# Patient Record
Sex: FEMALE | NOT HISPANIC OR LATINO | URBAN - METROPOLITAN AREA
[De-identification: names, ages, dates, MRNs, and addresses within clinical notes are randomized per-mention and may not be internally consistent; named-entity substitution may affect disease eponyms.]

---

## 2018-08-06 ENCOUNTER — HISTORICAL (OUTPATIENT)
Dept: ADMINISTRATIVE | Facility: HOSPITAL | Age: 38
End: 2018-08-06

## 2018-08-06 LAB
HAV IGM SERPL QL IA: NONREACTIVE
HBV CORE IGM SERPL QL IA: NONREACTIVE
HBV SURFACE AG SERPL QL IA: NEGATIVE
HCV AB SERPL QL IA: NONREACTIVE
HIV 1+2 AB+HIV1 P24 AG SERPL QL IA: NONREACTIVE
T PALLIDUM AB SER QL: NONREACTIVE

## 2019-01-30 ENCOUNTER — HISTORICAL (OUTPATIENT)
Dept: ADMINISTRATIVE | Facility: HOSPITAL | Age: 39
End: 2019-01-30

## 2019-01-30 LAB
ABS NEUT (OLG): 10.06 X10(3)/MCL (ref 2.1–9.2)
BASOPHILS # BLD AUTO: 0.02 X10(3)/MCL
BASOPHILS NFR BLD AUTO: 0 %
BUN SERPL-MCNC: 11 MG/DL (ref 7–18)
CALCIUM SERPL-MCNC: 9.3 MG/DL (ref 8.5–10.1)
CHLORIDE SERPL-SCNC: 103 MMOL/L (ref 98–107)
CO2 SERPL-SCNC: 28 MMOL/L (ref 21–32)
CREAT SERPL-MCNC: 0.9 MG/DL (ref 0.6–1.3)
CREAT/UREA NIT SERPL: 12
CRP SERPL-MCNC: <0.3 MG/DL
EOSINOPHIL # BLD AUTO: 0.09 X10(3)/MCL
EOSINOPHIL NFR BLD AUTO: 1 %
ERYTHROCYTE [DISTWIDTH] IN BLOOD BY AUTOMATED COUNT: 13.6 % (ref 11.5–14.5)
ERYTHROCYTE [SEDIMENTATION RATE] IN BLOOD: 15 MM/HR (ref 0–20)
EST. AVERAGE GLUCOSE BLD GHB EST-MCNC: 169 MG/DL
GLUCOSE SERPL-MCNC: 131 MG/DL (ref 74–106)
HBA1C MFR BLD: 7.5 % (ref 4.2–6.3)
HCT VFR BLD AUTO: 39.4 % (ref 35–46)
HGB BLD-MCNC: 12.9 GM/DL (ref 12–16)
IMM GRANULOCYTES # BLD AUTO: 0.04 10*3/UL
IMM GRANULOCYTES NFR BLD AUTO: 0 %
LYMPHOCYTES # BLD AUTO: 1.73 X10(3)/MCL
LYMPHOCYTES NFR BLD AUTO: 14 % (ref 13–40)
MCH RBC QN AUTO: 27.9 PG (ref 26–34)
MCHC RBC AUTO-ENTMCNC: 32.7 GM/DL (ref 31–37)
MCV RBC AUTO: 85.3 FL (ref 80–100)
MONOCYTES # BLD AUTO: 0.4 X10(3)/MCL
MONOCYTES NFR BLD AUTO: 3 % (ref 4–12)
NEUTROPHILS # BLD AUTO: 10.06 X10(3)/MCL
NEUTROPHILS NFR BLD AUTO: 82 X10(3)/MCL
PLATELET # BLD AUTO: 416 X10(3)/MCL (ref 130–400)
PMV BLD AUTO: 9.9 FL (ref 7.4–10.4)
POTASSIUM SERPL-SCNC: 3.9 MMOL/L (ref 3.5–5.1)
RBC # BLD AUTO: 4.62 X10(6)/MCL (ref 4–5.2)
SODIUM SERPL-SCNC: 138 MMOL/L (ref 136–145)
T4 SERPL-MCNC: 8.4 MCG/DL (ref 4.8–13.9)
TSH SERPL-ACNC: 1.64 MIU/L (ref 0.36–3.74)
WBC # SPEC AUTO: 12.3 X10(3)/MCL (ref 4.5–11)

## 2019-10-14 ENCOUNTER — HISTORICAL (OUTPATIENT)
Dept: ADMINISTRATIVE | Facility: HOSPITAL | Age: 39
End: 2019-10-14

## 2019-10-14 LAB
ABS NEUT (OLG): 5.19 X10(3)/MCL (ref 2.1–9.2)
APPEARANCE, UA: ABNORMAL
BACTERIA #/AREA URNS AUTO: ABNORMAL /[HPF]
BASOPHILS # BLD AUTO: 0 X10(3)/MCL (ref 0–0.2)
BASOPHILS NFR BLD AUTO: 0 %
BILIRUB UR QL STRIP: NEGATIVE
BUN SERPL-MCNC: 8 MG/DL (ref 7–18)
CALCIUM SERPL-MCNC: 8.5 MG/DL (ref 8.5–10.1)
CHLORIDE SERPL-SCNC: 108 MMOL/L (ref 98–107)
CO2 SERPL-SCNC: 29 MMOL/L (ref 21–32)
COLOR UR: ABNORMAL
CREAT SERPL-MCNC: 0.8 MG/DL (ref 0.6–1.3)
CREAT/UREA NIT SERPL: 10
EOSINOPHIL # BLD AUTO: 0.4 X10(3)/MCL (ref 0–0.9)
EOSINOPHIL NFR BLD AUTO: 4 %
ERYTHROCYTE [DISTWIDTH] IN BLOOD BY AUTOMATED COUNT: 14.6 % (ref 11.5–14.5)
EST. AVERAGE GLUCOSE BLD GHB EST-MCNC: 131 MG/DL
GLUCOSE (UA): NORMAL
GLUCOSE SERPL-MCNC: 122 MG/DL (ref 74–106)
HBA1C MFR BLD: 6.2 % (ref 4.2–6.3)
HCT VFR BLD AUTO: 34.1 % (ref 35–46)
HGB BLD-MCNC: 10.6 GM/DL (ref 12–16)
HGB UR QL STRIP: >1 MG/DL
HYALINE CASTS #/AREA URNS LPF: ABNORMAL /[LPF]
IMM GRANULOCYTES # BLD AUTO: 0.02 10*3/UL
IMM GRANULOCYTES NFR BLD AUTO: 0 %
KETONES UR QL STRIP: NEGATIVE
LEUKOCYTE ESTERASE UR QL STRIP: 25 LEU/UL
LYMPHOCYTES # BLD AUTO: 2.3 X10(3)/MCL (ref 0.6–4.6)
LYMPHOCYTES NFR BLD AUTO: 27 %
MCH RBC QN AUTO: 26.1 PG (ref 26–34)
MCHC RBC AUTO-ENTMCNC: 31.1 GM/DL (ref 31–37)
MCV RBC AUTO: 84 FL (ref 80–100)
MONOCYTES # BLD AUTO: 0.5 X10(3)/MCL (ref 0.1–1.3)
MONOCYTES NFR BLD AUTO: 6 %
NEUTROPHILS # BLD AUTO: 5.19 X10(3)/MCL (ref 2.1–9.2)
NEUTROPHILS NFR BLD AUTO: 62 %
NITRITE UR QL STRIP: NEGATIVE
PH UR STRIP: 6 [PH] (ref 4.5–8)
PLATELET # BLD AUTO: 359 X10(3)/MCL (ref 130–400)
PMV BLD AUTO: 10.2 FL (ref 7.4–10.4)
POTASSIUM SERPL-SCNC: 3.8 MMOL/L (ref 3.5–5.1)
PROT UR QL STRIP: 20 MG/DL
RBC # BLD AUTO: 4.06 X10(6)/MCL (ref 4–5.2)
RBC #/AREA URNS AUTO: >=100 /[HPF]
SODIUM SERPL-SCNC: 142 MMOL/L (ref 136–145)
SP GR UR STRIP: 1.02 (ref 1–1.03)
SQUAMOUS #/AREA URNS LPF: ABNORMAL /[LPF]
UROBILINOGEN UR STRIP-ACNC: NORMAL
WBC # SPEC AUTO: 8.4 X10(3)/MCL (ref 4.5–11)
WBC #/AREA URNS AUTO: ABNORMAL /HPF

## 2019-11-13 ENCOUNTER — HISTORICAL (OUTPATIENT)
Dept: RADIOLOGY | Facility: HOSPITAL | Age: 39
End: 2019-11-13

## 2019-12-04 ENCOUNTER — HISTORICAL (OUTPATIENT)
Dept: ADMINISTRATIVE | Facility: HOSPITAL | Age: 39
End: 2019-12-04

## 2019-12-04 LAB
APPEARANCE, UA: CLEAR
BACTERIA #/AREA URNS AUTO: ABNORMAL /HPF
BILIRUB SERPL-MCNC: NEGATIVE MG/DL
BILIRUB UR QL STRIP: NEGATIVE
BLOOD URINE, POC: NORMAL
CLARITY, POC UA: NORMAL
COLOR UR: YELLOW
COLOR, POC UA: YELLOW
GLUCOSE (UA): NEGATIVE
GLUCOSE UR QL STRIP: NEGATIVE
HGB UR QL STRIP: 0.03 MG/DL
HYALINE CASTS #/AREA URNS LPF: ABNORMAL /LPF
KETONES UR QL STRIP: NEGATIVE
KETONES UR QL STRIP: NORMAL
LEUKOCYTE EST, POC UA: NORMAL
LEUKOCYTE ESTERASE UR QL STRIP: 250 LEU/UL
NITRITE UR QL STRIP: NEGATIVE
NITRITE, POC UA: NEGATIVE
PH UR STRIP: 6.5 [PH] (ref 4.5–8)
PH, POC UA: 6
POC BETA-HCG (QUAL): NEGATIVE
PROT UR QL STRIP: 20 MG/DL
PROTEIN, POC: NORMAL
RBC #/AREA URNS AUTO: ABNORMAL /HPF
SP GR UR STRIP: 1.02 (ref 1–1.03)
SPECIFIC GRAVITY, POC UA: 1.02
SQUAMOUS #/AREA URNS LPF: >100 /LPF
UROBILINOGEN UR STRIP-ACNC: NORMAL
UROBILINOGEN, POC UA: NORMAL
WBC #/AREA URNS AUTO: ABNORMAL /HPF

## 2019-12-06 LAB — FINAL CULTURE: NO GROWTH

## 2020-01-02 ENCOUNTER — HISTORICAL (OUTPATIENT)
Dept: ADMINISTRATIVE | Facility: HOSPITAL | Age: 40
End: 2020-01-02

## 2020-01-02 LAB
ABS NEUT (OLG): 5.1 X10(3)/MCL (ref 2.1–9.2)
ALBUMIN SERPL-MCNC: 4 GM/DL (ref 3.4–5)
ALBUMIN/GLOB SERPL: 0.9 RATIO (ref 1.1–2)
ALP SERPL-CCNC: 78 UNIT/L (ref 45–117)
ALT SERPL-CCNC: 101 UNIT/L (ref 12–78)
AST SERPL-CCNC: 49 UNIT/L (ref 15–37)
BASOPHILS # BLD AUTO: 0 X10(3)/MCL (ref 0–0.2)
BASOPHILS NFR BLD AUTO: 0 %
BILIRUB SERPL-MCNC: 0.6 MG/DL (ref 0.2–1)
BILIRUBIN DIRECT+TOT PNL SERPL-MCNC: 0.1 MG/DL (ref 0–0.2)
BILIRUBIN DIRECT+TOT PNL SERPL-MCNC: 0.5 MG/DL
BUN SERPL-MCNC: 10 MG/DL (ref 7–18)
CALCIUM SERPL-MCNC: 9 MG/DL (ref 8.5–10.1)
CHLORIDE SERPL-SCNC: 106 MMOL/L (ref 98–107)
CO2 SERPL-SCNC: 26 MMOL/L (ref 21–32)
CREAT SERPL-MCNC: 1 MG/DL (ref 0.6–1.3)
CRP SERPL-MCNC: <0.3 MG/DL
EOSINOPHIL # BLD AUTO: 0.1 X10(3)/MCL (ref 0–0.9)
EOSINOPHIL NFR BLD AUTO: 2 %
ERYTHROCYTE [DISTWIDTH] IN BLOOD BY AUTOMATED COUNT: 15.6 % (ref 11.5–14.5)
ERYTHROCYTE [SEDIMENTATION RATE] IN BLOOD: 41 MM/HR (ref 0–20)
GLOBULIN SER-MCNC: 4.5 GM/ML (ref 2.3–3.5)
GLUCOSE SERPL-MCNC: 129 MG/DL (ref 74–106)
HCT VFR BLD AUTO: 35 % (ref 35–46)
HGB BLD-MCNC: 11 GM/DL (ref 12–16)
IMM GRANULOCYTES # BLD AUTO: 0.02 10*3/UL
IMM GRANULOCYTES NFR BLD AUTO: 0 %
LYMPHOCYTES # BLD AUTO: 2.4 X10(3)/MCL (ref 0.6–4.6)
LYMPHOCYTES NFR BLD AUTO: 29 %
MCH RBC QN AUTO: 26 PG (ref 26–34)
MCHC RBC AUTO-ENTMCNC: 31.4 GM/DL (ref 31–37)
MCV RBC AUTO: 82.7 FL (ref 80–100)
MONOCYTES # BLD AUTO: 0.5 X10(3)/MCL (ref 0.1–1.3)
MONOCYTES NFR BLD AUTO: 6 %
NEUTROPHILS # BLD AUTO: 5.1 X10(3)/MCL (ref 2.1–9.2)
NEUTROPHILS NFR BLD AUTO: 63 %
PLATELET # BLD AUTO: 415 X10(3)/MCL (ref 130–400)
PMV BLD AUTO: 9.9 FL (ref 7.4–10.4)
POTASSIUM SERPL-SCNC: 3.4 MMOL/L (ref 3.5–5.1)
PROT SERPL-MCNC: 8.5 GM/DL (ref 6.4–8.2)
RBC # BLD AUTO: 4.23 X10(6)/MCL (ref 4–5.2)
SODIUM SERPL-SCNC: 137 MMOL/L (ref 136–145)
WBC # SPEC AUTO: 8.1 X10(3)/MCL (ref 4.5–11)

## 2020-03-11 ENCOUNTER — HISTORICAL (OUTPATIENT)
Dept: RADIOLOGY | Facility: HOSPITAL | Age: 40
End: 2020-03-11

## 2020-06-01 ENCOUNTER — HISTORICAL (OUTPATIENT)
Dept: ADMINISTRATIVE | Facility: HOSPITAL | Age: 40
End: 2020-06-01

## 2020-06-01 LAB
ABS NEUT (OLG): 4.91 X10(3)/MCL (ref 2.1–9.2)
ALBUMIN SERPL-MCNC: 4 GM/DL (ref 3.4–5)
ALBUMIN/GLOB SERPL: 0.9 RATIO (ref 1.1–2)
ALP SERPL-CCNC: 89 UNIT/L (ref 45–117)
ALT SERPL-CCNC: 37 UNIT/L (ref 12–78)
AST SERPL-CCNC: 17 UNIT/L (ref 15–37)
BASOPHILS # BLD AUTO: 0 X10(3)/MCL (ref 0–0.2)
BASOPHILS NFR BLD AUTO: 0 %
BILIRUB SERPL-MCNC: 0.3 MG/DL (ref 0.2–1)
BILIRUBIN DIRECT+TOT PNL SERPL-MCNC: <0.1 MG/DL (ref 0–0.2)
BILIRUBIN DIRECT+TOT PNL SERPL-MCNC: ABNORMAL MG/DL
BUN SERPL-MCNC: 10 MG/DL (ref 7–18)
CALCIUM SERPL-MCNC: 9.4 MG/DL (ref 8.5–10.1)
CHLORIDE SERPL-SCNC: 104 MMOL/L (ref 98–107)
CO2 SERPL-SCNC: 27 MMOL/L (ref 21–32)
CREAT SERPL-MCNC: 0.9 MG/DL (ref 0.6–1.3)
CRP SERPL-MCNC: <0.3 MG/DL
EOSINOPHIL # BLD AUTO: 0.2 X10(3)/MCL (ref 0–0.9)
EOSINOPHIL NFR BLD AUTO: 2 %
ERYTHROCYTE [DISTWIDTH] IN BLOOD BY AUTOMATED COUNT: 15 % (ref 11.5–14.5)
ERYTHROCYTE [SEDIMENTATION RATE] IN BLOOD: 18 MM/HR (ref 0–20)
GLOBULIN SER-MCNC: 4.4 GM/ML (ref 2.3–3.5)
GLUCOSE SERPL-MCNC: 179 MG/DL (ref 74–106)
HBV CORE AB SERPL QL IA: NONREACTIVE
HBV CORE IGM SERPL QL IA: NONREACTIVE
HBV SURFACE AG SERPL QL IA: NONREACTIVE
HCT VFR BLD AUTO: 35.3 % (ref 35–46)
HCV AB SERPL QL IA: NONREACTIVE
HGB BLD-MCNC: 10.9 GM/DL (ref 12–16)
IMM GRANULOCYTES # BLD AUTO: 0.03 10*3/UL
IMM GRANULOCYTES NFR BLD AUTO: 0 %
LYMPHOCYTES # BLD AUTO: 2.4 X10(3)/MCL (ref 0.6–4.6)
LYMPHOCYTES NFR BLD AUTO: 30 %
MCH RBC QN AUTO: 24.5 PG (ref 26–34)
MCHC RBC AUTO-ENTMCNC: 30.9 GM/DL (ref 31–37)
MCV RBC AUTO: 79.5 FL (ref 80–100)
MONOCYTES # BLD AUTO: 0.5 X10(3)/MCL (ref 0.1–1.3)
MONOCYTES NFR BLD AUTO: 6 %
NEUTROPHILS # BLD AUTO: 4.91 X10(3)/MCL (ref 2.1–9.2)
NEUTROPHILS NFR BLD AUTO: 61 %
PLATELET # BLD AUTO: 434 X10(3)/MCL (ref 130–400)
PMV BLD AUTO: 9.9 FL (ref 7.4–10.4)
POTASSIUM SERPL-SCNC: 3.9 MMOL/L (ref 3.5–5.1)
PROT SERPL-MCNC: 8.4 GM/DL (ref 6.4–8.2)
RBC # BLD AUTO: 4.44 X10(6)/MCL (ref 4–5.2)
SODIUM SERPL-SCNC: 136 MMOL/L (ref 136–145)
URATE SERPL-MCNC: 4.9 MG/DL (ref 2.6–6)
WBC # SPEC AUTO: 8.1 X10(3)/MCL (ref 4.5–11)

## 2020-06-15 ENCOUNTER — HISTORICAL (OUTPATIENT)
Dept: ADMINISTRATIVE | Facility: HOSPITAL | Age: 40
End: 2020-06-15

## 2020-06-15 LAB
ABS NEUT (OLG): 6.29 X10(3)/MCL (ref 2.1–9.2)
BASOPHILS # BLD AUTO: 0 X10(3)/MCL (ref 0–0.2)
BASOPHILS NFR BLD AUTO: 0 %
EOSINOPHIL # BLD AUTO: 0.2 X10(3)/MCL (ref 0–0.9)
EOSINOPHIL NFR BLD AUTO: 2 %
ERYTHROCYTE [DISTWIDTH] IN BLOOD BY AUTOMATED COUNT: 15.3 % (ref 11.5–14.5)
HCT VFR BLD AUTO: 35.9 % (ref 35–46)
HGB BLD-MCNC: 10.9 GM/DL (ref 12–16)
IMM GRANULOCYTES # BLD AUTO: 0.02 10*3/UL
IMM GRANULOCYTES NFR BLD AUTO: 0 %
LYMPHOCYTES # BLD AUTO: 2.2 X10(3)/MCL (ref 0.6–4.6)
LYMPHOCYTES NFR BLD AUTO: 24 %
MCH RBC QN AUTO: 24.6 PG (ref 26–34)
MCHC RBC AUTO-ENTMCNC: 30.4 GM/DL (ref 31–37)
MCV RBC AUTO: 81 FL (ref 80–100)
MONOCYTES # BLD AUTO: 0.7 X10(3)/MCL (ref 0.1–1.3)
MONOCYTES NFR BLD AUTO: 7 %
NEUTROPHILS # BLD AUTO: 6.29 X10(3)/MCL (ref 2.1–9.2)
NEUTROPHILS NFR BLD AUTO: 66 %
PLATELET # BLD AUTO: 385 X10(3)/MCL (ref 130–400)
PMV BLD AUTO: 9.9 FL (ref 7.4–10.4)
PROLACTIN LEVEL (OHS): 28 NG/ML (ref 1–24)
RBC # BLD AUTO: 4.43 X10(6)/MCL (ref 4–5.2)
WBC # SPEC AUTO: 9.5 X10(3)/MCL (ref 4.5–11)

## 2020-09-14 ENCOUNTER — HISTORICAL (OUTPATIENT)
Dept: ADMINISTRATIVE | Facility: HOSPITAL | Age: 40
End: 2020-09-14

## 2020-09-14 LAB
ABS NEUT (OLG): 4.58 X10(3)/MCL (ref 2.1–9.2)
ALBUMIN SERPL-MCNC: 3.6 GM/DL (ref 3.4–5)
ALBUMIN/GLOB SERPL: 0.8 RATIO (ref 1.1–2)
ALP SERPL-CCNC: 110 UNIT/L (ref 45–117)
ALT SERPL-CCNC: 40 UNIT/L (ref 12–78)
APPEARANCE, UA: ABNORMAL
AST SERPL-CCNC: 27 UNIT/L (ref 15–37)
BACTERIA #/AREA URNS AUTO: ABNORMAL /HPF
BASOPHILS # BLD AUTO: 0 X10(3)/MCL (ref 0–0.2)
BASOPHILS NFR BLD AUTO: 0 %
BILIRUB SERPL-MCNC: 0.4 MG/DL (ref 0.2–1)
BILIRUB UR QL STRIP: NEGATIVE
BILIRUBIN DIRECT+TOT PNL SERPL-MCNC: <0.1 MG/DL (ref 0–0.2)
BILIRUBIN DIRECT+TOT PNL SERPL-MCNC: ABNORMAL MG/DL
BUN SERPL-MCNC: 10 MG/DL (ref 7–18)
CALCIUM SERPL-MCNC: 8.5 MG/DL (ref 8.5–10.1)
CHLORIDE SERPL-SCNC: 104 MMOL/L (ref 98–107)
CHOLEST SERPL-MCNC: 266 MG/DL
CHOLEST/HDLC SERPL: 6.6 {RATIO} (ref 0–4.4)
CO2 SERPL-SCNC: 27 MMOL/L (ref 21–32)
COLOR UR: YELLOW
CREAT SERPL-MCNC: 0.9 MG/DL (ref 0.6–1.3)
CREAT UR-MCNC: 427 MG/DL
EOSINOPHIL # BLD AUTO: 0.2 X10(3)/MCL (ref 0–0.9)
EOSINOPHIL NFR BLD AUTO: 3 %
ERYTHROCYTE [DISTWIDTH] IN BLOOD BY AUTOMATED COUNT: 14.9 % (ref 11.5–14.5)
EST. AVERAGE GLUCOSE BLD GHB EST-MCNC: 252 MG/DL
GLOBULIN SER-MCNC: 4.4 GM/ML (ref 2.3–3.5)
GLUCOSE (UA): 300 MG/DL
GLUCOSE SERPL-MCNC: 222 MG/DL (ref 74–106)
HBA1C MFR BLD: 10.4 % (ref 4.2–6.3)
HCT VFR BLD AUTO: 34.4 % (ref 35–46)
HDLC SERPL-MCNC: 40 MG/DL (ref 40–59)
HGB BLD-MCNC: 10.6 GM/DL (ref 12–16)
HGB UR QL STRIP: ABNORMAL MG/DL
HYALINE CASTS #/AREA URNS LPF: ABNORMAL /LPF
IMM GRANULOCYTES # BLD AUTO: 0.03 10*3/UL
IMM GRANULOCYTES NFR BLD AUTO: 0 %
KETONES UR QL STRIP: ABNORMAL
LDLC SERPL CALC-MCNC: 186 MG/DL
LEUKOCYTE ESTERASE UR QL STRIP: NEGATIVE
LYMPHOCYTES # BLD AUTO: 2.2 X10(3)/MCL (ref 0.6–4.6)
LYMPHOCYTES NFR BLD AUTO: 30 %
MCH RBC QN AUTO: 24.7 PG (ref 26–34)
MCHC RBC AUTO-ENTMCNC: 30.8 GM/DL (ref 31–37)
MCV RBC AUTO: 80.2 FL (ref 80–100)
MICROALBUMIN UR-MCNC: 188 MG/L (ref 0–19)
MICROALBUMIN/CREAT RATIO PNL UR: 44 MCG/MG CR (ref 0–29)
MONOCYTES # BLD AUTO: 0.4 X10(3)/MCL (ref 0.1–1.3)
MONOCYTES NFR BLD AUTO: 6 %
NEUTROPHILS # BLD AUTO: 4.58 X10(3)/MCL (ref 2.1–9.2)
NEUTROPHILS NFR BLD AUTO: 61 %
NITRITE UR QL STRIP: NEGATIVE
PH UR STRIP: 6 [PH] (ref 4.5–8)
PLATELET # BLD AUTO: 392 X10(3)/MCL (ref 130–400)
PMV BLD AUTO: 9.5 FL (ref 7.4–10.4)
POTASSIUM SERPL-SCNC: 3.6 MMOL/L (ref 3.5–5.1)
PROT SERPL-MCNC: 8 GM/DL (ref 6.4–8.2)
PROT UR QL STRIP: 70 MG/DL
RBC # BLD AUTO: 4.29 X10(6)/MCL (ref 4–5.2)
RBC #/AREA URNS AUTO: >=100 /HPF
SODIUM SERPL-SCNC: 137 MMOL/L (ref 136–145)
SP GR UR STRIP: 1.03 (ref 1–1.03)
SQUAMOUS #/AREA URNS LPF: >100 /LPF
TRIGL SERPL-MCNC: 201 MG/DL
UROBILINOGEN UR STRIP-ACNC: 2 MG/DL
VLDLC SERPL CALC-MCNC: 40 MG/DL
WBC # SPEC AUTO: 7.5 X10(3)/MCL (ref 4.5–11)
WBC #/AREA URNS AUTO: ABNORMAL /HPF

## 2021-01-14 ENCOUNTER — HISTORICAL (OUTPATIENT)
Dept: INTERNAL MEDICINE | Facility: CLINIC | Age: 41
End: 2021-01-14

## 2021-01-14 ENCOUNTER — HISTORICAL (OUTPATIENT)
Dept: ADMINISTRATIVE | Facility: HOSPITAL | Age: 41
End: 2021-01-14

## 2021-01-14 LAB
ABS NEUT (OLG): 3.83 X10(3)/MCL (ref 2.1–9.2)
ALBUMIN SERPL-MCNC: 4 GM/DL (ref 3.5–5)
ALBUMIN/GLOB SERPL: 1 RATIO (ref 1.1–2)
ALP SERPL-CCNC: 76 UNIT/L (ref 40–150)
ALT SERPL-CCNC: 25 UNIT/L (ref 0–55)
AST SERPL-CCNC: 19 UNIT/L (ref 5–34)
BASOPHILS # BLD AUTO: 0 X10(3)/MCL (ref 0–0.2)
BASOPHILS NFR BLD AUTO: 0 %
BILIRUB SERPL-MCNC: 0.4 MG/DL
BILIRUBIN DIRECT+TOT PNL SERPL-MCNC: 0.2 MG/DL (ref 0–0.5)
BILIRUBIN DIRECT+TOT PNL SERPL-MCNC: 0.2 MG/DL (ref 0–0.8)
BUN SERPL-MCNC: 7 MG/DL (ref 7–18.7)
CALCIUM SERPL-MCNC: 9.1 MG/DL (ref 8.4–10.2)
CHLORIDE SERPL-SCNC: 103 MMOL/L (ref 98–107)
CO2 SERPL-SCNC: 27 MMOL/L (ref 22–29)
CREAT SERPL-MCNC: 0.91 MG/DL (ref 0.55–1.02)
EOSINOPHIL # BLD AUTO: 0.2 X10(3)/MCL (ref 0–0.9)
EOSINOPHIL NFR BLD AUTO: 3 %
ERYTHROCYTE [DISTWIDTH] IN BLOOD BY AUTOMATED COUNT: 15.9 % (ref 11.5–14.5)
EST. AVERAGE GLUCOSE BLD GHB EST-MCNC: 205.9 MG/DL
GLOBULIN SER-MCNC: 4.2 GM/DL (ref 2.4–3.5)
GLUCOSE SERPL-MCNC: 135 MG/DL (ref 74–100)
HBA1C MFR BLD: 8.8 %
HCT VFR BLD AUTO: 34.8 % (ref 35–46)
HGB BLD-MCNC: 10.2 GM/DL (ref 12–16)
IMM GRANULOCYTES # BLD AUTO: 0.02 10*3/UL
IMM GRANULOCYTES NFR BLD AUTO: 0 %
LYMPHOCYTES # BLD AUTO: 2.2 X10(3)/MCL (ref 0.6–4.6)
LYMPHOCYTES NFR BLD AUTO: 33 %
MCH RBC QN AUTO: 22.6 PG (ref 26–34)
MCHC RBC AUTO-ENTMCNC: 29.3 GM/DL (ref 31–37)
MCV RBC AUTO: 77 FL (ref 80–100)
MONOCYTES # BLD AUTO: 0.4 X10(3)/MCL (ref 0.1–1.3)
MONOCYTES NFR BLD AUTO: 6 %
NEUTROPHILS # BLD AUTO: 3.83 X10(3)/MCL (ref 2.1–9.2)
NEUTROPHILS NFR BLD AUTO: 58 %
PLATELET # BLD AUTO: 516 X10(3)/MCL (ref 130–400)
PMV BLD AUTO: 9.5 FL (ref 7.4–10.4)
POTASSIUM SERPL-SCNC: 4.2 MMOL/L (ref 3.5–5.1)
PROT SERPL-MCNC: 8.2 GM/DL (ref 6.4–8.3)
RBC # BLD AUTO: 4.52 X10(6)/MCL (ref 4–5.2)
SODIUM SERPL-SCNC: 140 MMOL/L (ref 136–145)
WBC # SPEC AUTO: 6.6 X10(3)/MCL (ref 4.5–11)

## 2021-08-12 LAB
BILIRUB SERPL-MCNC: NEGATIVE MG/DL
BLOOD URINE, POC: NORMAL
CLARITY, POC UA: NORMAL
COLOR, POC UA: YELLOW
GLUCOSE UR QL STRIP: NORMAL
KETONES UR QL STRIP: NEGATIVE
LEUKOCYTE EST, POC UA: NEGATIVE
NITRITE, POC UA: NEGATIVE
PH, POC UA: 6
PROTEIN, POC: NORMAL
SPECIFIC GRAVITY, POC UA: NORMAL
UROBILINOGEN, POC UA: NORMAL

## 2022-04-10 ENCOUNTER — HISTORICAL (OUTPATIENT)
Dept: ADMINISTRATIVE | Facility: HOSPITAL | Age: 42
End: 2022-04-10
Payer: MEDICAID

## 2022-04-26 VITALS
SYSTOLIC BLOOD PRESSURE: 133 MMHG | OXYGEN SATURATION: 100 % | WEIGHT: 269.63 LBS | DIASTOLIC BLOOD PRESSURE: 94 MMHG | HEIGHT: 67 IN | BODY MASS INDEX: 42.32 KG/M2

## 2022-05-03 NOTE — HISTORICAL [facility] CERNER
This is a historical note converted from [employer]. Formatting and pictures may have been removed.  Please reference Cerlinda for original formatting and attached multimedia. Chief Complaint  annual  History of Present Illness  Pt is  here for annual gyn exam. Last pap 2016. Denies hx of abnormal pap. Reports one year hx of urinary frequency. States goes every minute. Pt admits to drinking 2 cups of coffee, tea, and energy drinks throughout the day. Denies hematuria/dysuria. Pt also reports over the last few months her periods are lasting longer (from 2 days to 7) and heavy with blood clots. Reports pelvic pressure. Pt had recent pelvic US with pcp that showed left ovarian cyst. Pt had TL for contraception. Discussed med list. States is currently on pcn for tooth infection.  ?  PMHx: bipolar (seen @ [facility]), HTN, DM, RA, Obesity  ?  Denies hx of dvt, pe, mi, or CVA. Denies fly hx of breast, ovarian, uterine, or colon?cancer.  Review of Systems  Negative except HPI  Physical Exam  Vitals & Measurements  T:?36.7? ?C (Oral)? HR:?76(Peripheral)? RR:?20? BP:?138/93? SpO2:?100%?  HT:?170?cm? WT:?123?kg? BMI:?42.56? LMP:?2019 00:00 CST?  General: Alert and oriented, No acute distress.  Breast: No mass, No tenderness, No discharge.  Gastrointestinal: Soft, Non-tender.  Gynecology:  Labia: Bilateral, Majora, Minora, Within normal limits.  Vagina: scant amount of white discharge  Cervix: No cervical motion tenderness, No lesions.  Uterus: Mobile, Not tender.9 weeks  Ovaries: very difficult exam secondary to body habitus; unable to palpate.  Integumentary: Warm, Dry.  Neurologic: Alert, Oriented.  Psychiatric: Cooperative, Appropriate mood & affect.  Assessment/Plan  1.?Encounter for gynecological examination with abnormal finding?Z01.411  ?pap,hpv  Ordered:  Office/Outpatient Visit Level 3 Established [id number] , Encounter for gynecological examination with abnormal finding  Urinary frequency  Ovarian cyst  Heavy menses   Routine screening for STI (sexually transmitted infection), [facility], 12/04/19 10:54:00 CST  Pathology Gyn Request, 12/04/19 10:54:00 CST, AP Specimen, Thin Prep Pap Cervical-Auto/man screen, Routine GYN/Pap, Cervical, Thin Prep with HPV Probe, Normal, 11/20/19, Previous Pap, 2016, Tubal Ligation, Previous Pregnancy, Abnormal Bleeding, Cervix Present, Routine, Julia...  ?  2.?Urinary frequency?R35.0  ?strongly encouraged decreased caffeine intake; will send UA/urine cx  Ordered:  Office/Outpatient Visit Level 3 Established 16420 , Encounter for gynecological examination with abnormal finding  Urinary frequency  Ovarian cyst  Heavy menses  Routine screening for STI (sexually transmitted infection), [facility], 12/04/19 10:54:00 CST  Urinalysis with Microscopic if Indicated, Routine collect, Urine, Order for future visit, 12/04/19 11:14:00 CST, Stop date 12/04/19 11:14:00 CST, Nurse collect, Urinary frequency  Urine Culture [id number], Routine collect, 12/04/19 11:14:00 CST, Order for future visit, Urine, Clean Catch, Nurse collect, Stop date 12/04/19 11:14:00 CST, Urinary frequency  ?  3.?Ovarian cyst?N83.209  ?Discussed with Dr Dawson who reviewed pts US; Recommendation is to begin provera daily and repeat US in 3 months. Pt instructed to call with any severe abd pain.  Ordered:  Clinic Follow up, 12/11/19 10:57:00 CST, Order for future visit, Ovarian cyst  Office/Outpatient Visit Level 3 Established 53447 , Encounter for gynecological examination with abnormal finding  Urinary frequency  Ovarian cyst  Heavy menses  Routine screening for STI (sexually transmitted infection), [facility], 12/04/19 10:54:00 CST  US Pelvic Non-Obstetrics, Routine, *Est. 03/04/20 3:00:00 CST, Other (please specify), None, Ambulatory, Rad Type, Order for future visit, Ovarian cyst  Heavy menses, Schedule this test, [facility], *Est. 03/04/20 3:00:00 CST  US Transvaginal Non-OB, Routine,  *Est. 03/04/20 3:00:00 CST, Other (please specify), None, Ambulatory, Rad Type, Order for future visit, Ovarian cyst  Heavy menses, Schedule this test, [facility], *Est. 03/04/20 3:00:00 CST  ?  4.?Heavy menses?N92.0  ?will begin provera 10mg daily  Ordered:  Office/Outpatient Visit Level 3 Established [id number] PC, Encounter for gynecological examination with abnormal finding  Urinary frequency  Ovarian cyst  Heavy menses  Routine screening for STI (sexually transmitted infection), [facility], 12/04/19 10:54:00 CST  US Pelvic Non-Obstetrics, Routine, *Est. 03/04/20 3:00:00 CST, Other (please specify), None, Ambulatory, Rad Type, Order for future visit, Ovarian cyst  Heavy menses, Schedule this test, [facility], *Est. 03/04/20 3:00:00 CST  US Transvaginal Non-OB, Routine, *Est. 03/04/20 3:00:00 CST, Other (please specify), None, Ambulatory, Rad Type, Order for future visit, Ovarian cyst  Heavy menses, Schedule this test, [facility], *Est. 03/04/20 3:00:00 CST  ?  5.?Routine screening for STI (sexually transmitted infection)?Z11.3  Ordered:  Chlamydia trach and N. gonorrhea PCR, Routine collect, Cervical, Order for future visit, 12/04/19 10:54:00 CST, Stop date 12/04/19 10:54:00 CST, Nurse collect, Routine screening for STI (sexually transmitted infection)  Hepatitis B Surface Antigen, Routine collect, 12/04/19 10:55:00 CST, Blood, Order for future visit, Stop date 12/04/19 10:55:00 CST, Lab Collect, Routine screening for STI (sexually transmitted infection), 12/04/19 10:55:00 CST  Hepatitis C Antibody, Routine collect, 12/04/19 10:55:00 CST, Blood, Order for future visit, Stop date 12/04/19 10:55:00 CST, Lab Collect, Routine screening for STI (sexually transmitted infection), 12/04/19 10:55:00 CST  HIV 1 and 2, Now collect, 12/04/19 10:54:00 CST, Blood, Order for future visit, Stop date 12/04/19 10:54:00 CST, Lab Collect, Routine screening for  STI (sexually transmitted infection), 12/04/19 10:54:00 CST  Office/Outpatient Visit Level 3 Established 90486 , Encounter for gynecological examination with abnormal finding  Urinary frequency  Ovarian cyst  Heavy menses  Routine screening for STI (sexually transmitted infection), [facility], 12/04/19 10:54:00 CST  Syphilis Antibody (with Reflex RPR), Routine collect, 12/04/19 10:54:00 CST, Blood, Order for future visit, Stop date 12/04/19 10:54:00 CST, Lab Collect, Routine screening for STI (sexually transmitted infection), 12/04/19 10:54:00 CST  Wet Prep Smear, Routine collect, Vaginal, Order for future visit, 12/04/19 10:54:00 CST, Stop date 12/04/19 10:54:00 CST, Nurse collect, Routine screening for STI (sexually transmitted infection), 12/04/19 10:54:00 CST  ?  Orders:  medroxyPROGESTERone, 10 mg = 1 tab(s), Oral, Daily, # 30 tab(s), 5 Refill(s), [facility]acy: [id number]  Urinalysis Dip POC, 12/04/19 10:54:00 CST, [facility]  Urine Pregnancy POC, 12/04/19 10:54:00 CST, [facility]  Referrals  Clinic Follow up, 12/11/19 10:57:00 CST, Order for future visit, Ovarian cyst   Problem List/Past Medical History  Ongoing  Bipolar 1 disorder  Carpal tunnel  De Quervains tenosynovitis  HTN - Hypertension  Morbid obesity  RA (rheumatoid arthritis)  Procedure/Surgical History  Colonoscopy, flexible; diagnostic, including collection of specimen(s) by brushing or washing, when performed (separate procedure) (05/10/2016)  Inspection of Lower Intestinal Tract, Via Natural or Artificial Opening Endoscopic (05/10/2016)  Tubal ligation   Medications  Norvasc 10 mg oral tablet, 10 mg= 1 tab(s), Oral, Daily, 4 refills  penicillin V potassium 250 mg oral tablet, 250 mg= 1 tab(s), Oral, q6hr  Provera 10 mg oral tablet, 10 mg= 1 tab(s), Oral, Daily, 5 refills  Allergies  No Known Medication Allergies  Social History  Abuse/Neglect  No, No, 12/04/2019  No, 11/05/2019  Alcohol - Low Risk,  09/12/2014  Current, 1-2 times per week, 10/14/2019  Employment/School  Employed, 10/14/2019  Exercise  Exercise type: Stretching., 10/14/2019  Home/Environment  Lives with Children. Living situation: Home/Independent., 10/14/2019  Nutrition/Health  Regular, Good, 10/14/2019  Sexual  Sexually active: Yes. Number of current partners 1. Sexual orientation: Straight or heterosexual. Gender Identity Identifies as female. Yes, 12/04/2019  Substance Use - Denies Substance Abuse, 09/12/2014  Never, 03/02/2016  Tobacco - Medium Risk, 03/09/2015  Cigars or pipes but not daily within last 30 days, N/A, 12/04/2019  Cigars or pipes daily within last 30 days, No, 11/05/2019  Cigars or pipes daily within last 30 days, Cigars, No, 10/14/2019  Family History  Hypertension.: Sister.  Immunizations  Vaccine Date Status Comments   influenza virus vaccine, inactivated 10/14/2019 Given    tetanus/diphtheria/pertussis, acel(Tdap) 01/30/2019 Given    influenza virus vaccine, inactivated 01/30/2019 Given    influenza virus vaccine, inactivated 10/13/2015 Given Pt helen well .voices no complaints .novisible distress noted

## 2022-05-03 NOTE — HISTORICAL [facility] CERNER
This is a historical note converted from [employer]. Formatting and pictures may have been removed.  Please reference [employer] for original formatting and attached multimedia. Chief Complaint  AUB states heavy bleeding with periods. Spotting in between periods. Complaints of pelvic pain and bloating  History of Present Illness  40  with HTN, DM2, antiphospholipid syndrome, and bipolar disorder presents for follow up for vaginal bleeding. She reports irregular menses prior to age 32, but subsequently has started to bleed heavily for more days but every month. She reports being on provera now since December that didnt originally help but does help her now.  She also reports urinary urgency, going to the bathroom every 5-15 minutes. She reports drinking numerous bottles of water per day along with 2 energy drinks. Denies any incontinence or burning with urination.  ?  ?  OBhx:  x 4, SAB x 4  Gynecological History  Last Menstrual Period: 22  Menstrual Status Intake: Menarcheal  Sexually Active: No  denies sti history  Review of Systems  Constitutional: No fever, No chills.  Respiratory: No shortness of breath.  Cardiovascular: No chest pain, No syncope.  Gastrointestinal: No nausea, No vomiting, No diarrhea, No constipation.  Genitourinary: No dysuria, No hematuria, No abnormal discharge or bleeding.  Neurologic: Alert and oriented X4  ?  Physical Exam  Vitals & Measurements  T:?36.7? ?C (Oral)? HR:?91(Peripheral)? RR:?20? BP:?128/85?  HT:?170?cm? WT:?128.1?kg? BMI:?44.33? LMP:?2020 00:00 CDT?  General Appearance: Alert, cooperative, no distress,  Abdomen: Soft, non-tender, bowel sounds active all four quadrants, no masses, no organomegaly  Genitalia:  - External genitalia: Normal without lesions  - Urethral meatus: Normal  - Bladder: No suprapubic tenderness  - Vaginal/pelvic support: Normal, moist vaginal mucosa without lesions. No blood  - Cervix: No CMT, no lesions  - Uterus: difficult to assess due  to habitus  - Adnexa/parametria: No fullness or masses  Extremities: Extremities normal, atraumatic, no cyanosis or edema  Skin: Skin turgor normal, no rashes or lesions.  ?   Endometrial Biopsy:  ?  UPT performed today was negative. ?Informed consent was obtained. Consent forms were signed and witnessed. ?  The patient was placed in dorsal lithotomy position. ?A speculum was placed in the vagina and good visualization of the cervix was achieved.  The cervix was swabbed with betadine x 3. ?The anterior lip of the cervix was grasped with a single-toothed tenaculum.  The endometrial pipelle was advanced to the fundus without difficulty. ?Two passes were performed and adequate tissue was noted.?  The tenaculum was removed. ?Tenaculum sites were noted to be hemostatic. All instruments were removed from the vagina. ?The patient tolerated the procedure well. ?  Endometrial biopsy was labeled and sent to pathology. ??  Assessment/Plan  1.?Menorrhagia?N92.0  ?US pelvis reviewed suspicious for adenomyosis discussed possible mirena IUD for bleeding but patient unsure. would like to continue provera for now. Not an estrogen candidate due to APL. rtc in 3 weeks for possible mirena insertion. emb today given multiple risk factors  Ordered:  CBC w/ Auto Diff, Routine collect, 06/15/20 11:53:00 CDT, Blood, Order for future visit, Stop date 06/15/20 11:53:00 CDT, Lab Collect, Menorrhagia, 06/15/20 11:53:00 CDT  Clinic Follow up, *Est. 07/13/20 3:00:00 CDT, Order for future visit, Menorrhagia  Pathology Tissue [facility], 06/15/20 12:02:00 CDT, AP Specimen, menorrhagia, endometrial biopsy, Routine, Collected, Hold Until Collected, RT - Routine, Menorrhagia, 06/15/20 12:02:00 CDT  Prolactin, Routine collect, 06/15/20 11:52:00 CDT, Blood, Order for future visit, Stop date 06/15/20 11:52:00 CDT, Lab Collect, Menorrhagia, 06/15/20 11:52:00 CDT  ?  2.?Urinary urgency?R39.15  ?discussed lifestyle changes including decreasing the amount of PO  intake and energy drinks. no indication for medication at this point  ?  3.?Obesity?E66.9  discussed healthy?eating and exercise. Patient to attempt to improve her diet and start to exercise more  ?   OB History  Pregnancy History???(2,2,0,4)?? ??  Pregnancy # 1  Baby 1?????????????  Outcome Date:?   ???   Outcome:?Live Birth  ???  Outcome or Result:?Vaginal  ???  Gender:?  Male????????  Gest Age:?  36 weeks ??????  Wt:?--  ???  Hospital:?  --????????  [facility]:?--  ???  Kalpana Name:?  --?????  Babys Father:?--  ?  Pregnancy # 2  Baby 1?????????????  Outcome Date:?   ???   Outcome:?Live Birth  ???  Outcome or Result:?Vaginal  ???  Gender:?  Female????????  Gest Age:?  36 weeks ??????  Wt:?--  ???  Hospital:?  --????????  [facility]:?--  ???  Kalpana Name:?  --?????  Babys Father:?--  ?  Pregnancy # 3  Baby 1?????????????  Outcome Date:?   ???   Outcome:?Live Birth  ???  Outcome or Result:?Vaginal  ???  Gender:?  Male????????  Gest Age:?  40 weeks ??????  Wt:?--  ???  Hospital:?  --????????  [facility]:?--  ???  Kalpana Name:?  --?????  Babys Father:?--  ?  Pregnancy # 4  Baby 1?????????????  Outcome Date:?   ???   Outcome:?Live Birth  ???  Outcome or Result:?Vaginal  ???  Gender:?  Female????????  Gest Age:?  40 weeks ??????  Wt:?--  ???  Hospital:?  --????????  [facility]:?--  ???  Kalpana Name:?  --?????  Babys Father:?--  Problem List/Past Medical History  Ongoing  Bipolar 1 disorder  Carpal tunnel syndrome  De Quervains tenosynovitis  Elevated sed rate  Fibromyalgia  HTN - Hypertension  Miscarriage  Morbid obesity  Positive cardiolipin antibodies  Soft tissue rheumatism  Historical  Carpal tunnel  Pregnant  Pregnant  Pregnant  Pregnant  RA (rheumatoid arthritis)  Rheumatoid factor positive  Procedure/Surgical History  Colonoscopy, flexible; diagnostic, including collection of specimen(s) by brushing or washing, when performed (separate procedure)  (05/10/2016)  Inspection of Lower Intestinal Tract, Via Natural or Artificial Opening Endoscopic (05/10/2016)  Tubal ligation   Medications  metformin 500 mg oral tablet, 500 mg= 1 tab(s), Oral, Daily, 6 refills  Norvasc 10 mg oral tablet, 10 mg= 1 tab(s), Oral, Daily, 4 refills  Provera 10 mg oral tablet, 10 mg= 1 tab(s), Oral, Daily, 5 refills  Allergies  No Known Medication Allergies  Social History  Abuse/Neglect  No, No, Yes, 12/09/2019  Alcohol - Low Risk, 09/12/2014  Current, 1-2 times per week, 10/14/2019  Employment/School  Employed, 10/14/2019  Exercise  Exercise type: Stretching., 10/14/2019  Home/Environment  Lives with Children. Living situation: Home/Independent., 10/14/2019  Nutrition/Health  Regular, Good, 10/14/2019  Sexual  Sexually active: Yes. Number of current partners 1. Sexual orientation: Straight or heterosexual. Gender Identity Identifies as female. Yes, 12/04/2019  Spiritual/Cultural  Mu-ism, Yes, 12/09/2019  Substance Use - Denies Substance Abuse, 09/12/2014  Never, 03/02/2016  Tobacco - Medium Risk, 03/09/2015  Cigars or pipes but not daily within last 30 days, Cigars, No, 06/15/2020  Marital Status    Diagnostic Results  (03/11/2020 12:59 CDT US Pelvic Non-OB w Transvag if needed)  Reason For Exam  Unspecified ovarian cyst;Other (please specify)  ?  Radiology Report  History:  Unspecified ovarian cyst  ?  Comparison:  13 November 2019  ?  Findings:  Transabdominal and transvaginal scanning of the pelvis with grayscale  and color Doppler.  ?  The anteverted uterus measures 8 cm in length. The endometrium  measures about 6 mm which is normal. There are nabothian cysts.  ?  The ovaries are normal in appearance for patients age with small  follicles. Transvaginally the right ovary measures 3.4 x 2.6 x 2.6 cm.  The left ovary measures 3.1 x 2.4 x 2.2 cm.  ?  No adnexal mass or free fluid.  ?  Impression:  Pelvic ultrasound is within normal limits for age. No suspicious  ovarian  lesion today.  ?  ?  Signature Line  Electronically Signed By: Skyler Ferrer MD  Date/Time Signed: 03/11/2020 13:33  ?  ?  This document has an image [1]     [1]?US Pelvic Non-OB w Transvag if needed; Skyler Ferrer MD 03/11/2020 12:59 CDT   Reviewed the patients medical history, residents findings on physical exam, and the diagnosis with treatment plan. Care provided was reasonable and necessary.?  I was personally present on this date of service and discussed the patients care on this actual date (amendment entered later).

## 2022-05-03 NOTE — HISTORICAL [facility] CERNER
This is a historical note converted from Jacques. Formatting and pictures may have been removed.  Please reference Jacques for original formatting and attached multimedia. Chief Complaint  f/u visit  History of Present Illness  PMHx: RA, HTN, bipolar disorder, carpal tunnel syndrome; chronic sinus congestion  LV: 09/2020  This is a 40 y.o AAF who presents to medicine clinic as a f/u.  ?   Has hx of bipolar/ depression -?reports anxiety symptoms well-controlled on?paroxetine; No SI/HI in clinic right now.  Followed by GYN for metromenorrhagia. Reports non-compliance with Provera. Also being followed by rheumatology for hx of RA. Reports referral was sent by Rheum for PT but patient states she missed her Physical Therapy appt. Currently not on any DMARDs; on Flexeril.  Reports pain all over, not watching her diet. She is exercising, doing PIYO. She reports nerve pain is everywhere and hard to tell what is new vs old pain.  Currently taking OTC NSAIDs for hx of RA- had blood work done recently.? Denies sob, cp, abdominal pain, blood in urine/stool.?Last??seen Rheum on 06/2020  ?   Patient was also diagnosed with Carpal tunnel syndrome in?[place] and given a referral to ortho for surgery but states she has not had an appt with them yet.? another referral today was sent during last visit. Patient reports noncompliance with her lisinopril despite having 3 refills left.  ?   Patient also reports areas of irritation and rash on occasion when she sweats frequently underneath her breasts. She denies the same rash in her groin. She reports daily cleaning and states she has tried powder to keep the area dry with minimal improvement.  Review of Systems  12 point ROS was performed and?all negative except as stated in the HPI.  Physical Exam  Vitals & Measurements  T:?37.5? ?C (Oral)? HR:?92(Peripheral)? RR:?20? BP:?119/74?  HT:?169.00?cm? WT:?122.800?kg? BMI:?43? LMP:?12/21/2020 00:00 CST?  General: well-nourished, well-developed in  no acute distress  HEENT: Atraumatic, normocephalic.  Neck: No JVD or carotid bruits. No lymphadenopathy.  Heart: RRR, no murmurs, gallops, clicks or rubs. S1, S2 present.?  Lungs: CTAB without rales, wheezes or rhonchi. Normal work of breathing. Chest rise symmetrical on inspiration.  Abd: Obese, Soft, non-tender, non-distended and without guarding. Bowel sounds present.  Extremities: Radial and pedal pulses 2+ bilaterally, no LE edema.  MSK: Moves all extremities purposefully. Mild swelling in bilateral fingers  Skin: Warm, dry. Areas of erythema underneath breasts folds with irritation.  Assessment/Plan  Essential Hypertension  Continue Norvasc 10mg and encouraged compliance with lisinopril 5mg daily for renal protection  Patient to keep BP log  DASH diet and exercise encouraged  ?   Rheumatoid arthritis  Not on any DMARDs at this time  also w/ hx of carpal tunnel syndrome - sent referral to ortho clinic at last visit, pending appt  Rx with acetaminophen for now.  Follows with Rheumatology clinic - missed her physical therapy session  ?   Diabetes Mellitus uncontrolled  A1C 10.4% on 09/2020  Counselled on dietary and lifestyle changes  Continue?metformin 1000 BID  Repeat A1C today  Eye exam - seen in fundus clinic on 09/2020 with no retinopathy, to follow up q1y  Will start on Invokana 100mg daily?for cardio protection benefits given A1c 10.4%, instructed patient to keep CBG log  Script given for lancets, test strips, and glucometer  Will see patient back in one month with glucose log to re-eval Invokana response  ?   Abnormal uterine bleeding  Follows with GYN clinic, last seen on 07/2020  Pt has one year supply of refills with Provera, reports she has not been taking due to running out of refills -discussed with patient she has multiple refills per chart review and should call GYN clinic if she is having difficulty filling this med  Counselled patient on compliance  ?   Hx of Bipolar Disorder /  Depression  Currently taking Paroxetine 20mg pod  Followed by outside psychiatrist  will defer mgmt to them  No SI/ HI today  ?   Intertrigo  Rx with ketoconazole cream 2% 1 christiano TOP daily x 2weeks  Educated on hygiene and weight loss  ?  ?  Health Maintenance:  TDAP: boostrix 2019  CAGE screenin today  HIV: NR 2018  Hepatitis: Neg 2018  Pneumococcal vaccine: N/A  Influenza vaccine: today 2021  Zoster vaccine: N/A  AAA U/S screening: N/A  Screening colonoscopy: over age 50  Pap smear: GYN following pt.  ?  ?  Counselling:  - Patient instructed to keep BP diary and CBG log  - Patient to comply with dietary modifications  - Relevant educational materials provided  ?  Ordered A1c  Imaging: none  Medications: reconciled, discussed and refills given.  RTC in?1 month.  Referrals  Clinic Follow up, *Est. 21 7:45:00 CST, Order for future visit, Diabetes, [facility]   Problem List/Past Medical History  Ongoing  Bipolar 1 disorder  Carpal tunnel syndrome  De Quervains tenosynovitis  Elevated sed rate  Fibromyalgia  HTN - Hypertension  Miscarriage  Morbid obesity  Positive cardiolipin antibodies  Rheumatoid arthritis  Soft tissue rheumatism  Historical  Carpal tunnel  Pregnant  Pregnant  Pregnant  Pregnant  RA (rheumatoid arthritis)  Rheumatoid factor positive  Procedure/Surgical History  Colonoscopy, flexible; diagnostic, including collection of specimen(s) by brushing or washing, when performed (separate procedure) (05/10/2016)  Inspection of Lower Intestinal Tract, Via Natural or Artificial Opening Endoscopic (05/10/2016)  Tubal ligation   Medications  cyclobenzaprine 10 mg oral tablet, 10 mg= 1 tab(s), Oral, TID,? ?Still taking, not as prescribed: bottle present  gabapentin 400 mg oral capsule, 400 mg= 1 cap(s), Oral, TID, 3 refills,? ?Still taking, not as prescribed: bottle present  Glucometer, See Instructions  Invokana 100 mg oral tablet, 100 mg= 1 tab(s), Oral, Daily, 1 refills  ketoconazole  2% topical cream, 1 christiano, TOP, Daily  Lancets and test strips, See Instructions, 11 refills  lisinopril 5 mg oral tablet, 5 mg= 1 tab(s), Oral, Daily, 3 refills,? ?Still taking, not as prescribed: bottle present  metFORMIN 1000 mg oral tablet, 1000 mg= 1 tab(s), Oral, BID, 3 refills,? ?Still taking, not as prescribed: bottle present  Norvasc 10 mg oral tablet, 10 mg= 1 tab(s), Oral, Daily, 4 refills,? ?Still taking, not as prescribed: bottle present  omeprazole 20 mg oral DR capsule, 20 mg= 1 cap(s), Oral, Daily, 6 refills  Paxil 20 mg oral tablet, 20 mg= 1 tab(s), Oral, Daily, 6 refills  Provera 10 mg oral tablet, 10 mg= 1 tab(s), Oral, Daily, 12 refills,? ?Still taking, not as prescribed  Zyrtec 10 mg oral tablet, 10 mg= 1 tab(s), Oral, Daily, 3 refills,? ?Still taking, not as prescribed: bottle present  Allergies  No Known Allergies  No Known Medication Allergies  Social History  Abuse/Neglect  No, No, Yes, 07/08/2020  No, No, Yes, 06/25/2020  Alcohol - Low Risk, 09/12/2014  Current, 1-2 times per week, 10/14/2019  Employment/School  Employed, 10/14/2019  Exercise  Exercise type: Stretching., 10/14/2019  Home/Environment  Lives with Children. Living situation: Home/Independent., 10/14/2019  Nutrition/Health  Regular, Good, 10/14/2019  Sexual  Sexually active: Yes. Number of current partners 1. Sexual orientation: Straight or heterosexual. Gender Identity Identifies as female. Yes, 12/04/2019  Spiritual/Cultural  Yarsanism, Yes, 12/09/2019  Substance Use - Denies Substance Abuse, 09/12/2014  Never, 03/02/2016  Tobacco - Medium Risk, 03/09/2015  Cigars or pipes but not daily within last 30 days, No, 07/08/2020  Cigars or pipes but not daily within last 30 days, No, 06/25/2020  Family History  Hypertension.: Sister.  Immunizations  Vaccine Date Status Comments   influenza virus vaccine, inactivated 01/04/2021 Given    influenza virus vaccine, inactivated 10/14/2019 Given    tetanus/diphtheria/pertussis, acel(Tdap)  01/30/2019 Given    influenza virus vaccine, inactivated 01/30/2019 Given    influenza virus vaccine, inactivated 10/13/2015 Given Pt helen well .voices no complaints .novisible distress noted   tetanus/diphtheria/pertussis, acel(Tdap) 08/19/2009 Recorded    measles/mumps/rubella virus vaccine 12/11/2002 Recorded    measles/mumps/rubella virus vaccine 08/08/1983 Recorded    Health Maintenance  Health Maintenance  ???Pending?(in the next year)  ???There are no current recommendations pending  ??? ??Due In Future?  ??? ? ? ?Diabetes Maintenance-Foot Exam not due until??06/25/21??and every 1??year(s)  ??? ? ? ?Diabetes Maintenance-HgbA1c not due until??09/14/21??and every 1??year(s)  ??? ? ? ?Hypertension Management-BMP not due until??09/14/21??and every 1??year(s)  ??? ? ? ?Diabetes Maintenance-Fasting Lipid Profile not due until??09/14/21??and every 1??year(s)  ??? ? ? ?Diabetes Maintenance-Serum Creatinine not due until??09/14/21??and every 1??year(s)  ??? ? ? ?Hypertension Management-Education not due until??09/14/21??and every 1??year(s)  ??? ? ? ?Influenza Vaccine not due until??10/01/21??and every 1??day(s)  ??? ? ? ?Obesity Screening not due until??01/01/22??and every 1??year(s)  ??? ? ? ?Smoking Cessation not due until??01/01/22??and every 1??year(s)  ??? ? ? ?Alcohol Misuse Screening not due until??01/02/22??and every 1??year(s)  ???Satisfied?(in the past 1 year)  ??? ??Satisfied?  ??? ? ? ?ADL Screening on??01/04/21.??Satisfied by Becki Irving LPN  ??? ? ? ?Alcohol Misuse Screening on??01/04/21.??Satisfied by Susanna Winslow DO  ??? ? ? ?Blood Pressure Screening on??01/04/21.??Satisfied by Becki Irving LPN  ??? ? ? ?Body Mass Index Check on??01/04/21.??Satisfied by Becki Irving LPN  ??? ? ? ?Depression Screening on??01/04/21.??Satisfied by Becki Irving LPN  ??? ? ? ?Diabetes Maintenance-Eye Exam on??09/19/20.??Satisfied by Cricket Rosales MD  ??? ? ? ?Diabetes Maintenance-Fasting  Lipid Profile on??09/14/20.??Satisfied by Alvina Babb  ??? ? ? ?Diabetes Maintenance-HgbA1c on??09/14/20.??Satisfied by Alvina Babb  ??? ? ? ?Diabetes Maintenance-Serum Creatinine on??09/14/20.??Satisfied by Alvina Babb  ??? ? ? ?Diabetes Maintenance-Microalbumin on??09/14/20.??Satisfied by Alvina Babb  ??? ? ? ?Diabetes Maintenance-Foot Exam on??06/25/20.??Satisfied by Kylah Darling MD  ??? ? ? ?Diabetes Screening on??09/14/20.??Satisfied by Alvina Babb  ??? ? ? ?Hypertension Management-Blood Pressure on??01/04/21.??Satisfied by Becki Irving LPN  ??? ? ? ?Hypertension Management-Education on??09/14/20.??Satisfied by Susanna Winslow DO  ??? ? ? ?Hypertension Management-BMP on??09/14/20.??Satisfied by Alvina Babb  ??? ? ? ?Influenza Vaccine on??01/04/21.??Satisfied by Becki Irving LPN  ??? ? ? ?Obesity Screening on??01/04/21.??Satisfied by Becki Irving LPN  ??? ? ? ?Smoking Cessation on??01/04/21.??Satisfied by Susanna Winslow DO  ?      Patient discussed in clinic with medicine resident  Agree with STARTING iNVOKANA  Will be seen repeat labs  Agree with above assessment and plan  ?

## 2022-05-03 NOTE — HISTORICAL [facility] CERN[facility]
This is a historical note converted from Jacques. Formatting and pictures may have been removed.  Please reference Jacques for original formatting and attached multimedia. Chief Complaint  Pt here for a follow up and review labs  History of Present Illness  PMHx: RA, HTN, bipolar disorder, carpal tunnel syndrome; chronic sinus congestion  LV: june 2020  This is a 40 y.o AAF who presents to medicine clinic as a f/u.  ?   Has hx of bipolar/ depression -?reports anxiety symptoms well-controlled on?paroxetine; No SI/HI in clinic right now.  Followed by GYN for metromenorrhagia. Also being followed by rheumatology for hx of RA. Reports referral was sent by Rheum for PT but patient has not received any appt. Spoke with nurse and patient needs new referral for PT. Will send today. Currently not on any DMARDs; on Flexeril.  Reports pain all over, not watching her diet. She is exercising, doing PIYO. She reports nerve pain is everywhere and hard to tell what is new vs old pain. Reports pins and needles sensation in her hands and feet. She states the only time the gabapentin works is when she takes it with the Tylenol and muscle relaxer. She thinks the gabapentin is not working as well anymore.  ?   Currently taking OTC NSAIDs for hx of RA- had blood work done recently.? Denies sob, cp, abdominal pain, blood in urine/stool.?  ?  Patient was also diagnosed with Carpal tunnel syndrome in?[place] and given a referral to ortho for surgery but states she has not had an appt with them yet. Will send another referral today.  Review of Systems  12 point ROS was performed and?all negative except as stated in the HPI.  Physical Exam  Vitals & Measurements  T:?36.8? ?C (Oral)? HR:?101(Peripheral)? RR:?20? BP:?116/89?  HT:?169.00?cm? WT:?122.200?kg? BMI:?42.79? LMP:?09/14/2020 00:00 CDT?  General: well-nourished, well-developed in no acute distress  HEENT: Atraumatic, normocephalic.  Neck: No JVD or carotid bruits. No lymphadenopathy.  Heart:  RRR, no murmurs, gallops, clicks or rubs. S1, S2 present.?  Lungs: CTAB without rales, wheezes or rhonchi. Normal work of breathing. Chest rise symmetrical on inspiration.  Abd: Obese, Soft, non-tender, non-distended and without guarding. Bowel sounds present.  Extremities: Radial and pedal pulses 2+ bilaterally, no LE edema.  MSK: Moves all extremities purposefully. Mild swelling in bilateral fingers  Skin: Warm, dry and without rashes.  Assessment/Plan  Essential Hypertension  Continue Norvasc 10mg  Patient to keep BP log  DASH diet and exercise encouraged  Will start Lisinopril 5 mg daily for renal protection  ?   Rheumatoid arthritis  Not on any DMARDs at this time  also w/ hx of carpal tunnel syndrome - sent referral to ortho clinic today  Rx with acetaminophen for now.  Follows with Rheumatology clinic  ?   Diabetes Mellitus uncontrolled  A1C 10.4%  Counselled on dietary and lifestyle changes  Increased metformin to 1000 BID  Repeat A1C in 2 months for reeval  Eye exam done today - requested results  ?  ?   Hx of Bipolar Disorder / Depression  Currently taking Paroxetine 20mg pod  Followed by outside psychiatrist  will defer mgmt to them  No SI/ HI today  ?  ?   Health Maintenance:  TDAP: boostrix 2019  CAGE screenin/4 Oct 2019  HIV: NR 2018  Hepatitis: Neg 2018  Pneumococcal vaccine: N/A  Influenza vaccine: today 2019  Zoster vaccine: N/A  AAA U/S screening: N/A  Screening colonoscopy: over age 50  Pap smear: GYN following pt.  ?  ?  Counselling:  - Patient instructed to keep BP diary  - Patient to comply with dietary modifications  - Relevant educational materials provided  ?  Ordered A1c  Imaging: none  Medications: reconciled, discussed and refills given.  RTC in?2 months  ?   Problem List/Past Medical History  Ongoing  Bipolar 1 disorder  Carpal tunnel syndrome  De Quervains tenosynovitis  Elevated sed rate  Fibromyalgia  HTN - Hypertension  Miscarriage  Morbid obesity  Positive  cardiolipin antibodies  Soft tissue rheumatism  Historical  Carpal tunnel  Pregnant  Pregnant  Pregnant  Pregnant  RA (rheumatoid arthritis)  Rheumatoid factor positive  Procedure/Surgical History  Colonoscopy, flexible; diagnostic, including collection of specimen(s) by brushing or washing, when performed (separate procedure) (05/10/2016)  Inspection of Lower Intestinal Tract, Via Natural or Artificial Opening Endoscopic (05/10/2016)  Tubal ligation   Medications  aspirin 81 mg oral tablet, 81 mg= 1 tab(s), Oral, Daily  cyclobenzaprine 10 mg oral tablet, 10 mg= 1 tab(s), Oral, TID  gabapentin 400 mg oral capsule, 400 mg= 1 cap(s), Oral, TID, 3 refills  lisinopril 5 mg oral tablet, 5 mg= 1 tab(s), Oral, Daily, 3 refills  metFORMIN 1000 mg oral tablet, 1000 mg= 1 tab(s), Oral, BID, 3 refills  Norvasc 10 mg oral tablet, 10 mg= 1 tab(s), Oral, Daily, 4 refills  omeprazole 20 mg oral DR capsule, 20 mg= 1 cap(s), Oral, Daily, 6 refills  Paxil 20 mg oral tablet, 20 mg= 1 tab(s), Oral, Daily, 6 refills  Provera 10 mg oral tablet, 10 mg= 1 tab(s), Oral, Daily, 12 refills  Zyrtec 10 mg oral tablet, 10 mg= 1 tab(s), Oral, Daily, 3 refills  Allergies  No Known Allergies  No Known Medication Allergies  Social History  Abuse/Neglect  No, No, Yes, 07/08/2020  No, No, Yes, 06/25/2020  Alcohol - Low Risk, 09/12/2014  Current, 1-2 times per week, 10/14/2019  Employment/School  Employed, 10/14/2019  Exercise  Exercise type: Stretching., 10/14/2019  Home/Environment  Lives with Children. Living situation: Home/Independent., 10/14/2019  Nutrition/Health  Regular, Good, 10/14/2019  Sexual  Sexually active: Yes. Number of current partners 1. Sexual orientation: Straight or heterosexual. Gender Identity Identifies as female. Yes, 12/04/2019  Spiritual/Cultural  Holiness, Yes, 12/09/2019  Substance Use - Denies Substance Abuse, 09/12/2014  Never, 03/02/2016  Tobacco - Medium Risk, 03/09/2015  Cigars or pipes but not daily within last 30  days, No, 07/08/2020  Cigars or pipes but not daily within last 30 days, No, 06/25/2020  Family History  Hypertension.: Sister.  Immunizations  Vaccine Date Status Comments   influenza virus vaccine, inactivated 10/14/2019 Given    tetanus/diphtheria/pertussis, acel(Tdap) 01/30/2019 Given    influenza virus vaccine, inactivated 01/30/2019 Given    influenza virus vaccine, inactivated 10/13/2015 Given Pt helen well .voices no complaints .novisible distress noted   Health Maintenance  Health Maintenance  ???Pending?(in the next year)  ??? ??OverDue  ??? ? ? ?Diabetes Maintenance-Microalbumin due??and every?  ??? ? ? ?Influenza Vaccine due??and every?  ??? ??Due?  ??? ? ? ?Diabetes Maintenance-Eye Exam due??09/14/20??and every?  ??? ??Due In Future?  ??? ? ? ?Obesity Screening not due until??01/01/21??and every 1??year(s)  ??? ? ? ?Smoking Cessation not due until??01/01/21??and every 1??year(s)  ??? ? ? ?Alcohol Misuse Screening not due until??01/02/21??and every 1??year(s)  ??? ? ? ?ADL Screening not due until??06/01/21??and every 1??year(s)  ??? ? ? ?Diabetes Maintenance-Foot Exam not due until??06/25/21??and every 1??year(s)  ???Satisfied?(in the past 1 year)  ??? ??Satisfied?  ??? ? ? ?ADL Screening on??06/01/20.??Satisfied by Berna Goss LPN  ??? ? ? ?Alcohol Misuse Screening on??06/25/20.??Satisfied by Kylah Darling MD  ??? ? ? ?Blood Pressure Screening on??09/14/20.??Satisfied by Cristel Mattson LPN  ??? ? ? ?Body Mass Index Check on??09/14/20.??Satisfied by Cristel Mattson LPN  ??? ? ? ?Cervical Cancer Screening on??12/04/19.??Satisfied by Willow Shaw  ??? ? ? ?Depression Screening on??09/14/20.??Satisfied by Cristel Mattson LPN  ??? ? ? ?Diabetes Maintenance-Eye Exam on??09/14/20.??Satisfied by Susanna Winslow DO  ??? ? ? ?Diabetes Maintenance-Fasting Lipid Profile on??09/14/20.??Satisfied by Alvina Babb  ??? ? ? ?Diabetes Maintenance-HgbA1c on??09/14/20.??Satisfied by Alvina Babb  Jc  ??? ? ? ?Diabetes Maintenance-Microalbumin on??09/14/20.??Satisfied by Avlina Babb  ??? ? ? ?Diabetes Maintenance-Foot Exam on??06/25/20.??Satisfied by Kylah Darling MD  ??? ? ? ?Diabetes Screening on??09/14/20.??Satisfied by Alvina Babb  ??? ? ? ?Hypertension Management-Education on??09/14/20.??Satisfied by Susanna Winslow DO  ??? ? ? ?Hypertension Management-Blood Pressure on??09/14/20.??Satisfied by Cristel Mattson LPN  ??? ? ? ?Hypertension Management-BMP on??09/14/20.??Satisfied by Alvina Babb  ??? ? ? ?Influenza Vaccine on??10/14/19.??Satisfied by Erwin Huerta RN  ??? ? ? ?Obesity Screening on??09/14/20.??Satisfied by Cristel Mattson LPN  ??? ? ? ?Smoking Cessation on??09/14/20.??Satisfied by Susanna Winslow DO  ?      Patient discussed in clinic with medicine resident  Care provided is appropriate  Agree with above assessment and plan

## 2022-05-03 NOTE — HISTORICAL [facility] CERNER
This is a historical note converted from Jacques. Formatting and pictures may have been removed.  Please reference Jacques for original formatting and attached multimedia. Chief Complaint  annual  History of Present Illness  Pt is  here for annual gyn exam. Denies hx of abnormal pap. Last pap?3/2016=NIL; HPV negative[1] .?LMP 18.?Denies abnormal bleeding/discharge. Denies pain. Pt had TL for contraception. No gyn complaints. Pt has new partner and desires sti screening. Pt  is supposed to be on paxil 20mg po daily for depression/anxiety but has been out of meds. She is typically followed at [facility]. Denies suicidal/homicidal ideations.  has a good support system and prays when she gets stressed. 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  Review of Systems  Negative except HPI  Physical Exam  Vitals & Measurements  T:?98.1? ?F (Oral)? HR:?81(Peripheral)? RR:?20? BP:?147/98?  HT:?170?cm? HT:?170?cm? WT:?122.3?kg? WT:?122.3?kg? BMI:?42.32?  General: Alert and oriented, No acute distress.  Breast: No mass, No tenderness, No discharge.  Gastrointestinal: Soft, Non-tender.  Gynecology:  Labia: Bilateral, Majora, Minora, Within normal limits.  Vagina: Within normal limits.  Cervix: No cervical motion tenderness, No lesions.  Uterus: Mobile, Not tender.  Ovaries: Not tender, No mass.  Integumentary: Warm, Dry.  Neurologic: Alert, Oriented.  Psychiatric: Cooperative, Appropriate mood & affect.  Assessment/Plan  1.?Visit for routine gyn exam  ? -pelvic; pap utd per acog guidelines  ? -rtc one year  Ordered:  Clinic Follow up, *Est. 19 3:00:00 CDT, 1 Year, Order for future visit, Visit for routine gyn exam, [facility]  Preventative Health Care Est 18-39 years [id number] PC, Visit for routine gyn exam  Routine screening for STI (sexually transmitted infection)  Depression, [facility], 18 16:07:00 CDT  ?  2.?Routine screening for STI (sexually transmitted  infection)  Ordered:  Chlamydia trach and N. gonorrhea PCR, Routine collect, Cervical, Order for future visit, 08/06/18 16:07:00 CDT, Stop date 08/06/18 16:07:00 CDT, Nurse collect, Routine screening for STI (sexually transmitted infection)  Hepatitis Panel [facility], Now collect, 08/06/18 16:07:00 CDT, Blood, Order for future visit, Stop date 08/06/18 16:07:00 CDT, Lab Collect, Routine screening for STI (sexually transmitted infection), 08/06/18 16:07:00 CDT  HIV 1 and 2, Now collect, 08/06/18 16:07:00 CDT, Blood, Order for future visit, Stop date 08/06/18 16:07:00 CDT, Lab Collect, Routine screening for STI (sexually transmitted infection), 08/06/18 16:07:00 CDT  Preventative Health Care Est 18-39 years [phone] PC, Visit for routine gyn exam  Routine screening for STI (sexually transmitted infection)  Depression, [facility], 08/06/18 16:07:00 CDT  Syphilis Antibody (with Reflex RPR), Routine collect, 08/06/18 16:07:00 CDT, Blood, Order for future visit, Stop date 08/06/18 16:07:00 CDT, Lab Collect, Routine screening for STI (sexually transmitted infection), 08/06/18 16:07:00 CDT  Wet Prep Smear, Routine collect, Vaginal, Order for future visit, 08/06/18 16:07:00 CDT, Stop date 08/06/18 16:07:00 CDT, Nurse collect, Routine screening for STI (sexually transmitted infection), 08/06/18 16:07:00 CDT  ?  3.?Depression  ? -pt states is out of paxil 20mg. Pt has bottle with her. She states that she will go?to [facility] when she leaves here to get a follow up appt. Denies any suicidal/homicidal ideations today  Ordered:  Preventative Health Care Est 18-39 years [id number] PC, Visit for routine gyn exam  Routine screening for STI (sexually transmitted infection)  Depression, [facility], 08/06/18 16:07:00 CDT  ?  Orders:  PARoxetine, 20 mg = 1 tab(s), Oral, Daily, # 30 tab(s), 11 Refill(s), Pharmacy: [id number]   Problem List/Past Medical History  Ongoing  Bipolar 1 disorder  Carpal tunnel  syndrome  De Quervains tenosynovitis  Morbid obesity  RA (rheumatoid arthritis)  Procedure/Surgical History  Colonoscopy, flexible; diagnostic, including collection of specimen(s) by brushing or washing, when performed (separate procedure) (05/10/2016)  Inspection of Lower Intestinal Tract, Via Natural or Artificial Opening Endoscopic (05/10/2016)  btl  Tubal ligation   Medications  Paxil 20 mg oral tablet, 20 mg= 1 tab(s), Oral, Daily, 11 refills  Allergies  No Known Medication Allergies  Social History  Alcohol - Low Risk, 09/12/2014  Current, Beer, 1-2 times per month, 09/12/2014  Employment/School  Employed, Activity level: Occasional physical work. Highest education level: University degree(s). Operates hazardous equipment: No., 10/13/2015  Exercise  Self assessment: Poor condition., 10/13/2015  Home/Environment  Lives with Spouse. Living situation: Home/Independent. Alcohol abuse in household: No. Substance abuse in household: No. Smoker in household: No. Injuries/Abuse/Neglect in household: No. Feels unsafe at home: No. Family/Friends available for support: Yes. Concern for family members at home: No. Major illness in household: No. Financial concerns: No. TV/Computer concerns: No., 10/13/2015  Nutrition/Health  Type of diet: regular. Regular, Wants to lose weight: No. Sleeping concerns: Yes. Feels highly stressed: Yes., 10/13/2015  Substance Abuse - Denies Substance Abuse, 09/12/2014  Never, 03/02/2016  Tobacco - Medium Risk, 03/09/2015  Current some day smoker, Cigars, 03/09/2015  Family History  Hypertension.: Sister.     [1]?Office Visit Note; Carol VELEZ, Marianne Perez 07/31/2017 15:36 CDT

## 2022-05-03 NOTE — HISTORICAL [facility] CERNER
This is a historical note converted from Cerner. Formatting and pictures may have been removed.  Please reference Cerner for original formatting and attached multimedia. Attempted to call Ms. Flanagan with EMB results. She has scheduled follow up 7/8 with GYN for possible Mirena. Left a brief voicemail to call us back on a well identified voicemail box.  ?  Endometrium, Biopsy:  - Proliferative phase endometrium.  - No evidence of malignancy or atypia.  ?

## 2022-05-03 NOTE — HISTORICAL [facility] CERNER
This is a historical note converted from Cerlinda. Formatting and pictures may have been removed.  Please reference Cerlinda for original formatting and attached multimedia. Chief Complaint  Established  History of Present Illness  ????? Ms. Flanagan is a 38 y/o female who presents for Rheumatology clinic w/ fibromyalgia. Labs in workup resulted with an elevated sed rate?and positive cardiolipin ab--significant hx of miscarriages (LA not detected). She came to us with?a borderline positive RF 15 (>12.5)?w/ normal inflammatory markers in 1/2019. ARUP RF returned negative.?Reports pain in hands worse with use. Also has pain is all day from fibromyalgia.?Today, she is reporting increased?stiffness in her neck muscles?and?right piriformis.?Has had an EMG-NC?that dx her w/CTS R>L, was given a brace and told she could have sx but has not.?She has panic attacks and anxiety. Reports heavy menstrual cycles?and an ovarian cyst that is being monitored by gyn--next appt?[place] 10. Here today to discuss lab results and plans.  ?   ROS: Denies cough, shortness of breath, eye inflammation, LAD, serositis, blood clots, hair loss, rash, Raynauds, organ involvement, oral / nasal ulcers, sicca.  +miscarriage x 5 (<10 wk), soft tissue sensitivity, joint pain, anemia  ?   PMH: Bipolar disorder, HTN, fibro, CTS, hx of miscarriage  Social Hx: occasional ETOH, denies drug use, +occasional black and mild cigs  FH AI dz: negative  Review of Systems  General: Denies chills and fever  Ophthalmologic: Denies discharge. Denies flashes of light in visual field  ENT: Denies decreased sense of smell. Denies nosebleeds.?  Endocrine: Denies excessive sweating. Denies weight loss  Respiratory: Denies hemoptysis. Denies tuberculosis.  Cardiovascular: Denies cyanosis. Denies rheumatic fever.  Gastrointestinal: Denies difficulty swallowing. Denies hematemesis.  Hematology: Denies bleeding problems. Denies recent transfusion.  Peripheral vascular: Denies absent  pulses in feet. Denies ulceration of the feet  Skin: Denies blistering of skin. Denies skin cancer.  Neurologic denies seizures. Denies stroke.  Psychiatric: Denies eating disorder. Denies loss of appetite.?  Physical Exam  Vitals & Measurements  T:?36.7? ?C (Oral)? HR:?96(Peripheral)? RR:?18? BP:?121/83?  HT:?170?cm? WT:?126?kg? BMI:?43.6?  General examination: Alert, pleasant, in no acute distress.  Head: Normocephalic, atraumatic.  Eyes: Conjunctiva clear, upper eyelids normal, lower eyelids normal.  Oral cavity: Mucosa moist, no lesions, fair dentition.  Throat: No exudate, no erythema.  Neck/thyroid: Neck supple, no lymphadenopathy, no thyroid nodules  Skin: No suspicious lesions, warm and dry.  Heart: S1, S2 normal, regular rate and rhythm. No JVD.  Lungs: Clear to auscultation, no accessory muscle use.  Musculoskeletal: FROM. No synovitis. No deformities. 18/18 fibro tender points, tenderness over right piriformis.  Extremities: No clubbing or cyanosis.  Neurologic: Alert and oriented, cranial nerves II through XII grossly intact. Muscle strength grossly intact. Sensation grossly intact.  Psych: Cognitive function intact, mood/affect full range.?  Assessment/Plan  1.Elevated ESR. 41 (<20). ARUP RF negative.?She has a former borderline elevation in RF by [facility], 15 (<12.5). No synovitis on exam. Pain all day from soft tissue sensitivity.?Check hep serologies repeat sed rate.  ?   2. Positive Cardiolipin ab. 18 (13-19 = intermediated) Hx of multiple miscarriages. She recalls approximately 5 miscarriages and thinks they were all prior to 10 weeks gestation. Had 3/4 children at 8 months gestation. Repeat study today. Start baby asa.  ?   3. Menorrhagia. Reports heavy menstrual cycle x 2 weeks now. [facility].  ?   4. Hx of Fibromyalgia. Discussed gaining control of underlying anxiety, depression, performing gentle exercise, and getting restful sleep. Works graveyard shift. Having lots of  issues with generalized soft tissue pain.  ?   5. Soft Tissue Rheumatism. Right piriformis syndrome. Refer to PT.  ?   6. Bipolar Disorder. Denies SI/HI. Encouraged f/u w/ psych.  ?   7. HTN. Continue current regimen.  ?   8. TBO. Encouraged cessation.  ?  Labs?today. RTC 3 months  > Today I spent 30 minutes in face-to-face time with the patient, 50% in direct counseling and coordination of care. We discussed the potential diagnoses, therapeutic options, treatment plan and what to expect in the future from this condition.  Referrals  Ambulatory Referral, Specialty: Physical Therapy, Reason: soft tissue rheumatism. Evaluate and tx., Start: 06/01/20 9:07:00 CDT, Instructions: refer to [facility] Soft tissue rheumatism  Fibromyalgia  Clinic Follow up, *Est. 09/01/20 9:00:00 CDT, Order for future visit, Soft tissue rheumatism, [facility]  Clinic Follow up, 06/01/20 9:19:36 CDT, Miscarriage, [facility]   Problem List/Past Medical History  Ongoing  Bipolar 1 disorder  Carpal tunnel syndrome  De Quervains tenosynovitis  Elevated sed rate  Fibromyalgia  HTN - Hypertension  Miscarriage  Morbid obesity  Positive cardiolipin antibodies  Soft tissue rheumatism  Historical  Carpal tunnel  Pregnant  Pregnant  Pregnant  Pregnant  RA (rheumatoid arthritis)  Rheumatoid factor positive  Procedure/Surgical History  Colonoscopy, flexible; diagnostic, including collection of specimen(s) by brushing or washing, when performed (separate procedure) (05/10/2016)  Inspection of Lower Intestinal Tract, Via Natural or Artificial Opening Endoscopic (05/10/2016)  Tubal ligation   Medications  cetirizine 10 mg oral tablet, 10 mg= 1 tab(s), Oral, Daily,? ?Not taking  cyclobenzaprine 10 mg oral tablet, See Instructions, 1 refills  metformin 500 mg oral tablet, 500 mg= 1 tab(s), Oral, Daily, 6 refills  naproxen 500 mg oral delayed release tablet, 500 mg= 1 tab(s), Oral, BID, 3 refills  Norvasc 10 mg oral tablet, 10 mg= 1  tab(s), Oral, Daily, 4 refills  Prilosec 20 mg oral DR capsule, 20 mg= 1 cap(s), Oral, Daily,? ?Not taking  Provera 10 mg oral tablet, 10 mg= 1 tab(s), Oral, Daily, 5 refills  Allergies  No Known Medication Allergies  Social History  Abuse/Neglect  No, No, Yes, 12/09/2019  Alcohol - Low Risk, 09/12/2014  Current, 1-2 times per week, 10/14/2019  Employment/School  Employed, 10/14/2019  Exercise  Exercise type: Stretching., 10/14/2019  Home/Environment  Lives with Children. Living situation: Home/Independent., 10/14/2019  Nutrition/Health  Regular, Good, 10/14/2019  Sexual  Sexually active: Yes. Number of current partners 1. Sexual orientation: Straight or heterosexual. Gender Identity Identifies as female. Yes, 12/04/2019  Spiritual/Cultural  Pentecostalism, Yes, 12/09/2019  Substance Use - Denies Substance Abuse, 09/12/2014  Never, 03/02/2016  Tobacco - Medium Risk, 03/09/2015  Cigars or pipes but not daily within last 30 days, N/A, 06/01/2020  Cigars or pipes daily within last 30 days, No, 01/02/2020  Family History  Hypertension.: Sister.  Immunizations  Vaccine Date Status Comments   influenza virus vaccine, inactivated 10/14/2019 Given    tetanus/diphtheria/pertussis, acel(Tdap) 01/30/2019 Given    influenza virus vaccine, inactivated 01/30/2019 Given    influenza virus vaccine, inactivated 10/13/2015 Given Pt helen well .voices no complaints .novisible distress noted   Health Maintenance  Health Maintenance  ???Pending?(in the next year)  ??? ??OverDue  ??? ? ? ?Alcohol Misuse Screening due??01/01/20??and every 1??year(s)  ??? ? ? ?Smoking Cessation due??01/01/20??and every 1??year(s)  ??? ??Due?  ??? ? ? ?Diabetes Maintenance-Eye Exam due??06/01/20??and every?  ??? ? ? ?Diabetes Maintenance-Fasting Lipid Profile due??06/01/20??and every?  ??? ? ? ?Diabetes Maintenance-Foot Exam due??06/01/20??and every?  ??? ? ? ?Hypertension Management-Education due??06/01/20??and every 1??year(s)  ??? ??Due In Future?  ??? ? ?  ?Diabetes Maintenance-HgbA1c not due until??10/13/20??and every 1??year(s)  ??? ? ? ?Obesity Screening not due until??01/01/21??and every 1??year(s)  ??? ? ? ?Smoking Cessation (Diabetes) not due until??01/29/21??and every 2??year(s)  ???Satisfied?(in the past 1 year)  ??? ??Satisfied?  ??? ? ? ?ADL Screening on??06/01/20.??Satisfied by Berna Goss LPN  ??? ? ? ?Alcohol Misuse Screening on??06/19/19.??Satisfied by Kylah Darling MD  ??? ? ? ?Blood Pressure Screening on??06/01/20.??Satisfied by Berna Goss LPN  ??? ? ? ?Body Mass Index Check on??06/01/20.??Satisfied by Berna Goss LPN  ??? ? ? ?Cervical Cancer Screening on??12/04/19.??Satisfied by Willow Shaw  ??? ? ? ?Diabetes Maintenance-HgbA1c on??10/14/19.??Satisfied by Justin Richmond Jr.  ??? ? ? ?Diabetes Screening on??01/02/20.??Satisfied by Lorena Valencia  ??? ? ? ?Hypertension Management-Blood Pressure on??06/01/20.??Satisfied by Berna Goss LPN  ??? ? ? ?Influenza Vaccine on??10/14/19.??Satisfied by Erwin Huerta RN  ??? ? ? ?Obesity Screening on??06/01/20.??Satisfied by Berna Goss LPN  ?

## 2022-05-03 NOTE — HISTORICAL [facility] CERNER
This is a historical note converted from Jacques. Formatting and pictures may have been removed.  Please reference Jacques for original formatting and attached multimedia. Chief Complaint  New Patient  History of Present Illness  ???? Ms. Flanagan is a 38 y/o female who presents for Rheumatology clinic w/ hx of RA. She had a borderline positive RF 15 (>12.5)?w/ normal inflammatory markers in 1/2019. She states she was previously?dx in 2014 by the [facility]. She reports taking Aleve and Tylenol Arthritis and finds this to help. Has not been on DMARDs. She reports the right fifth PIP to be painful and the right 2nd DIP to be painful. Pain is all day. States many years ago she had an EMG-NC?that dx her w/CTS R>L, was given a brace and told she could have sx. Never?was scheduled?w/sx. She has panic attacks and anxiety. Reports heavy menstrual cycles now going on 2 weeks. Has an ovarian cyst that is being monitored by gyn.  ?  ROS: Denies cough, shortness of breath, eye inflammation, LAD, serositis, blood clots, hair loss, rash, Raynauds, organ involvement, oral / nasal ulcers, sicca.  +miscarriage x 5 (<10 wk), soft tissue sensitivity, joint pain, anemia  ?  PMH: Bipolar disorder, HTN, fibro, CTS, hx of miscarriage  Social Hx: occasional ETOH, denies drug use, +occasional black and mild cigs  FH AI dz: negative  Review of Systems  General: Denies chills and fever  Ophthalmologic: Denies discharge. Denies flashes of light in visual field  ENT: Denies decreased sense of smell. Denies nosebleeds.?  Endocrine: Denies excessive sweating. Denies weight loss  Respiratory: Denies hemoptysis. Denies tuberculosis.  Cardiovascular: Denies cyanosis. Denies rheumatic fever.  Gastrointestinal: Denies difficulty swallowing. Denies hematemesis.  Hematology: Denies bleeding problems. Denies recent transfusion.  Peripheral vascular: Denies absent pulses in feet. Denies ulceration of the feet  Skin: Denies blistering of skin. Denies skin  cancer.  Neurologic denies seizures. Denies stroke.  Psychiatric: Denies eating disorder. Denies loss of appetite.?  Physical Exam  Vitals & Measurements  T:?37.4? ?C (Oral)? HR:?94(Peripheral)? BP:?136/91?  HT:?169?cm? WT:?119.9?kg? BMI:?41.98?  General examination: Alert, pleasant, in no acute distress.  Head: Normocephalic, atraumatic.  Eyes: Conjunctiva clear, upper eyelids normal, lower eyelids normal.  Oral cavity: Mucosa moist, no lesions, fair dentition.  Throat: No exudate, no erythema.  Neck/thyroid: Neck supple, no lymphadenopathy, no thyroid nodules  Skin: No suspicious lesions, warm and dry.  Heart: S1, S2 normal, regular rate and rhythm. No JVD.  Lungs: Clear to auscultation, no accessory muscle use.  Musculoskeletal: FROM. No synovitis. No deformities. 18/18 fibro tender points  Extremities: No clubbing or cyanosis.  Neurologic: Alert and oriented, cranial nerves II through XII grossly intact. Muscle strength grossly intact. Sensation grossly intact.  Psych: Cognitive function intact, mood/affect full range.?  Referrals  Clinic Follow up, *Est. 04/06/20 10:30:00 CDT, Order for future visit, Fibromyalgia, [facility]  1. Positive RF. She has a borderline elevation in RF by [facility], 15 (<12.5). No synovitis on exam. Pain all day and difficult to differentiate from soft tissue sensitivity. Will repeat w/ [facility]-RF and CCP. Also, CBC, CMP, CRP, ESR and check xrays of the hands and feet.  ?  2. Hx of multiple miscarriages. She recalls approximately 5 miscarriages and thinks they were all prior to 10 weeks gestation. Had 3/4 children at 8 months gestation. Check APLA.  ?  3. Menorrhagia. Reports heavy menstrual cycle x 2 weeks now. Will check H&H. She is reportedly going to check with gyn to get an appt to discuss this before leaving today.  ?  4. Hx of Fibromyalgia. Discussed gaining control of underlying anxiety, depression, performing gentle exercise, and getting restful sleep.  ?  5. Bipolar  Disorder. Denies SI/HI. Encouraged f/u w/ psych.  ?  6. HTN. Continue current regimen.  ?  Labs and xrays today. RTC 3 months  > Today I spent 60 minutes in face-to-face time with the patient, 50% in direct counseling and coordination of care. We discussed the potential diagnoses, therapeutic options, treatment plan and what to expect in the future from this condition.   Problem List/Past Medical History  Ongoing  Bipolar 1 disorder  Carpal tunnel  Carpal tunnel syndrome  De Quervains tenosynovitis  Fibromyalgia  HTN - Hypertension  Miscarriage  Morbid obesity  RA (rheumatoid arthritis)  Rheumatoid factor positive  Historical  Pregnant  Pregnant  Pregnant  Pregnant  Procedure/Surgical History  Colonoscopy, flexible; diagnostic, including collection of specimen(s) by brushing or washing, when performed (separate procedure) (05/10/2016)  Inspection of Lower Intestinal Tract, Via Natural or Artificial Opening Endoscopic (05/10/2016)  Tubal ligation   Medications  metformin 500 mg oral tablet, 500 mg= 1 tab(s), Oral, Daily, 6 refills,? ?Not taking  naproxen 375 mg oral tablet, 375 mg= 1 tab(s), Oral, BID,? ?Not taking  Norvasc 10 mg oral tablet, 10 mg= 1 tab(s), Oral, Daily, 4 refills  penicillin V potassium 250 mg oral tablet, 250 mg= 1 tab(s), Oral, q6hr,? ?Not Taking, Completed Rx  Provera 10 mg oral tablet, 10 mg= 1 tab(s), Oral, Daily, 5 refills  TRAZODONE 100 MG TABLET, 100 mg= 1 tab(s), qPM,? ?Not taking  Allergies  No Known Medication Allergies  Social History  Abuse/Neglect  No, No, Yes, 12/09/2019  Alcohol - Low Risk, 09/12/2014  Current, 1-2 times per week, 10/14/2019  Employment/School  Employed, 10/14/2019  Exercise  Exercise type: Stretching., 10/14/2019  Home/Environment  Lives with Children. Living situation: Home/Independent., 10/14/2019  Nutrition/Health  Regular, Good, 10/14/2019  Sexual  Sexually active: Yes. Number of current partners 1. Sexual orientation: Straight or heterosexual. Gender Identity  Identifies as female. Yes, 12/04/2019  Spiritual/Cultural  Anglican, Yes, 12/09/2019  Substance Use - Denies Substance Abuse, 09/12/2014  Never, 03/02/2016  Tobacco - Medium Risk, 03/09/2015  Cigars or pipes daily within last 30 days, No, 01/02/2020  Family History  Hypertension.: Sister.  Immunizations  Vaccine Date Status Comments   influenza virus vaccine, inactivated 10/14/2019 Given    tetanus/diphtheria/pertussis, acel(Tdap) 01/30/2019 Given    influenza virus vaccine, inactivated 01/30/2019 Given    influenza virus vaccine, inactivated 10/13/2015 Given Pt helen well .voices no complaints .novisible distress noted   Health Maintenance  Health Maintenance  ???Pending?(in the next year)  ??? ??OverDue  ??? ? ? ?Smoking Cessation due??01/01/20??and every 1??year(s)  ??? ??Due?  ??? ? ? ?Alcohol Misuse Screening due??01/01/20??and every 1??year(s)  ??? ? ? ?Diabetes Maintenance-Eye Exam due??01/02/20??and every?  ??? ? ? ?Diabetes Maintenance-Fasting Lipid Profile due??01/02/20??and every?  ??? ? ? ?Diabetes Maintenance-Foot Exam due??01/02/20??and every?  ??? ? ? ?Hypertension Management-Education due??01/02/20??and every 1??year(s)  ??? ??Due In Future?  ??? ? ? ?Diabetes Maintenance-HgbA1c not due until??10/13/20??and every 1??year(s)  ??? ? ? ?Hypertension Management-BMP not due until??10/13/20??and every 1??year(s)  ??? ? ? ?Blood Pressure Screening not due until??01/01/21??and every 1??year(s)  ??? ? ? ?Body Mass Index Check not due until??01/01/21??and every 1??year(s)  ??? ? ? ?Hypertension Management-Blood Pressure not due until??01/01/21??and every 1??year(s)  ??? ? ? ?Obesity Screening not due until??01/01/21??and every 1??year(s)  ???Satisfied?(in the past 1 year)  ??? ??Satisfied?  ??? ? ? ?ADL Screening on??01/02/20.??Satisfied by Berna Goss LPN  ??? ? ? ?Alcohol Misuse Screening on??06/19/19.??Satisfied by Kylah Darling MD  ??? ? ? ?Blood Pressure Screening on??01/02/20.??Satisfied by Wolfgang SAUCEDO  Berna  ??? ? ? ?Body Mass Index Check on??01/02/20.??Satisfied by Berna Goss LPN  ??? ? ? ?Cervical Cancer Screening on??12/04/19.??Satisfied by Willow Shaw  ??? ? ? ?Depression Screening on??01/30/19.??Satisfied by Joya Hickman LPN  ??? ? ? ?Diabetes Maintenance-HgbA1c on??10/14/19.??Satisfied by Justin Richmond Jr.  ??? ? ? ?Diabetes Screening on??10/14/19.??Satisfied by Justin Richmond Jr.  ??? ? ? ?Hypertension Management-Blood Pressure on??01/02/20.??Satisfied by Berna Goss LPN  ??? ? ? ?Influenza Vaccine on??10/14/19.??Satisfied by Erwin Huerta RN  ??? ? ? ?Obesity Screening on??01/02/20.??Satisfied by Berna Goss LPN  ??? ? ? ?Tetanus Vaccine on??01/30/19.??Satisfied by Joya Hickman LPN  ?

## 2022-05-03 NOTE — HISTORICAL [facility] CERNER
This is a historical note converted from Jacques. Formatting and pictures may have been removed.  Please reference Cerlinda for original formatting and attached multimedia. Chief Complaint  Lab results. On no meds. c/o heavy cycle. Missed GYN appt. ?c/o pain after fall this AM.  History of Present Illness  PMHx: RA, HTN, bipolar disorder, carpal tunnel syndrome; chronic sinus congestion  LV: June 2019  This is a 39 y.o AAF who presents to medicine clinic as a f/u.  Has hx of bipolar/ depression - on trazodone and paroxetine; sees Dr De Leon at [facility]. No SI/HI in clinic right now.  Has not taken any meds since December; states she has felt well without them.  C/O passing clots per vagina over last 6 weeks; experiencing menorrhagia as well. Missed last GYN appointment as well.  Had a fall earlier this morning: tripped over a blanket and hit her left side. ROM is intact, does have point tenderness of the shoulder.  Currently taking OTC Nsaids for hx of RA- had blood work done recently. Endorsing diffuse body aches today. Denies sob, cp, abdominal pain, blood in urine/stool.  Review of Systems  Except as documented, all other systems reviewed and are negative.  Physical Exam  Vitals & Measurements  T:?36.7? ?C (Oral)? HR:?71(Peripheral)? RR:?20? BP:?166/109?  HT:?170?cm? WT:?123.2?kg? BMI:?42.63? LMP:?10/06/2019 00:00 CDT?  Gen: NAD  HEENT: NCAT, EOMI, moist oral mucosa,?neck supple  Resp: CTAB. Normal inspiratory effort. No cough.  CVS: S1, S2, regular.? normal peripheral perfusion  Abd: NT, ND, soft, no masses.  MSK: NROM, +point tenderness of L shoulder  Skin: no rash, no skin changes  Neuro: AAOx3, CNII-XII intact. no focal deficits. Strength 5/5 b/l; sensation intact  Assessment/Plan  Hypertension  Currently on no BP meds; BP is 166/109  Continue Norvasc 10mg today  Patient to keep BP log  DASH diet and exercise encouraged  RTC 1 month for BP check - nurse visit  ?   Rheumatoid arthritis  Currently endorsing b/l wrist  and shoulder pain  Not on any DMARDs at this time  also w/ hx of carpal tunnel syndrome  RF panel shows RF igM and increased complements  Rx with acetaminophen for now.  Referral to rheumatology is pending.  ?   Diabetes  A1C 7.5%; repeat A1C 6.2%  Counselled on dietary and lifestyle changes  Continue metformin 500mg poD  Repeat A1C in 3 months for reeval  ?   Carpal Tunnel Syndrome  Present for the last 6-8 months  takes NSAIDs with minimal relief  Will refer to neurology clinic for CSI of the carpal tunnel  ?   Hx of Bipolar Disorder / Depression  Currently taking Paroxetine, Trazodone  Followed by outside psychiatrist  will defer mgmt to them  ?  ?   Health Maintenance:  TDAP: boostrix 2019  CAGE screenin/4 Oct 2019  HIV: NR 2018  Hepatitis: Neg 2018  Pneumococcal vaccine: N/A  Influenza vaccine: today 2019  Zoster vaccine: N/A  AAA U/S screening: N/A  Screening colonoscopy: over age 50  Pap smear: GYN pending  ?  ?  Counselling:  - Patient instructed to keep BP diary  - Patient to comply with dietary modifications  - Relevant educational materials provided  ?  Ordered CBC, CMP, UA, A1C  Imaging: Pelvic US/ TVUS  Medications: reconciled, discussed and refills given.  RTC in?4 months  Referrals  Rheumatology Referral  Gyn Referral  Neuro Referral   Problem List/Past Medical History  Ongoing  Bipolar 1 disorder  Carpal tunnel  Carpal tunnel syndrome  De Quervains tenosynovitis  HTN - Hypertension  Morbid obesity  RA (rheumatoid arthritis)  Historical  No qualifying data  Procedure/Surgical History  Colonoscopy, flexible; diagnostic, including collection of specimen(s) by brushing or washing, when performed (separate procedure) (05/10/2016)  Inspection of Lower Intestinal Tract, Via Natural or Artificial Opening Endoscopic (05/10/2016)  Tubal ligation   Medications  HYDROXYZINE BRAN 25 MG CAP, 25 mg= 1 cap(s), BID,? ?Not taking  influenza virus vaccine, inactivated quadrivalent intramuscular  suspension, 0.5 mL, IM, Once-Unscheduled  metformin 500 mg oral tablet, 500 mg= 1 tab(s), Oral, Daily, 6 refills,? ?Not taking  Norvasc 10 mg oral tablet, 10 mg= 1 tab(s), Oral, Daily, 4 refills,? ?Not taking  TRAZODONE 100 MG TABLET, 100 mg= 1 tab(s), qPM,? ?Not taking  Allergies  No Known Medication Allergies  Social History  Abuse/Neglect  No, 10/14/2019  Alcohol - Low Risk, 09/12/2014  Current, 1-2 times per week, 10/14/2019  Employment/School  Employed, 10/14/2019  Exercise  Exercise type: Stretching., 10/14/2019  Home/Environment  Lives with Children. Living situation: Home/Independent., 10/14/2019  Nutrition/Health  Regular, Good, 10/14/2019  Substance Use - Denies Substance Abuse, 09/12/2014  Never, 03/02/2016  Tobacco - Medium Risk, 03/09/2015  Cigars or pipes daily within last 30 days, Cigars, No, 10/14/2019  Family History  Hypertension.: Sister.  Immunizations  Vaccine Date Status Comments   tetanus/diphtheria/pertussis, acel(Tdap) 01/30/2019 Given    influenza virus vaccine, inactivated 01/30/2019 Given    influenza virus vaccine, inactivated 10/13/2015 Given Pt helen well .voices no complaints .novisible distress noted   Health Maintenance  Health Maintenance  ???Pending?(in the next year)  ??? ??OverDue  ??? ? ? ?Smoking Cessation due??01/01/19??and every 1??year(s)  ??? ? ? ?Cervical Cancer Screening due??03/11/19??and every 3??year(s)  ??? ??Due?  ??? ? ? ?Diabetes Maintenance-Eye Exam due??10/14/19??and every?  ??? ? ? ?Diabetes Maintenance-Fasting Lipid Profile due??10/14/19??and every?  ??? ? ? ?Diabetes Maintenance-Foot Exam due??10/14/19??and every?  ??? ? ? ?Hypertension Management-Education due??10/14/19??and every 1??year(s)  ??? ? ? ?Influenza Vaccine due??10/14/19??and every?  ??? ??Due In Future?  ??? ? ? ?Alcohol Misuse Screening not due until??01/01/20??and every 1??year(s)  ??? ? ? ?Obesity Screening not due until??01/01/20??and every 1??year(s)  ??? ? ? ?Hypertension Management-BMP not  due until??01/30/20??and every 1??year(s)  ??? ? ? ?ADL Screening not due until??06/19/20??and every 1??year(s)  ??? ? ? ?Blood Pressure Screening not due until??10/13/20??and every 1??year(s)  ??? ? ? ?Body Mass Index Check not due until??10/13/20??and every 1??year(s)  ??? ? ? ?Diabetes Maintenance-HgbA1c not due until??10/13/20??and every 1??year(s)  ??? ? ? ?Hypertension Management-Blood Pressure not due until??10/13/20??and every 1??year(s)  ???Satisfied?(in the past 1 year)  ??? ??Satisfied?  ??? ? ? ?ADL Screening on??06/19/19.??Satisfied by Joya Hickman LPN  ??? ? ? ?Alcohol Misuse Screening on??06/19/19.??Satisfied by Kylah Darling MD  ??? ? ? ?Blood Pressure Screening on??10/14/19.??Satisfied by Erwin Huerta RN  ??? ? ? ?Body Mass Index Check on??10/14/19.??Satisfied by Erwin Huerta RN  ??? ? ? ?Depression Screening on??01/30/19.??Satisfied by Joya Hickman LPN  ??? ? ? ?Diabetes Maintenance-HgbA1c on??10/14/19.??Satisfied by Justin Richmond Jr.  ??? ? ? ?Diabetes Screening on??10/14/19.??Satisfied by Justin Richmond Jr.  ??? ? ? ?Hypertension Management-Blood Pressure on??10/14/19.??Satisfied by Erwin Huerta RN  ??? ? ? ?Influenza Vaccine on??10/14/19.??Satisfied by Kylah Darling MD  ??? ? ? ?Obesity Screening on??10/14/19.??Satisfied by Erwin Huerta RN  ??? ? ? ?Tetanus Vaccine on??01/30/19.??Satisfied by Joya Hickman LPN  ?      Management and plan discussed with resident?at time of clinic visit. Care was reasonable and necessary. Routine follow up

## 2022-09-21 ENCOUNTER — HISTORICAL (OUTPATIENT)
Dept: ADMINISTRATIVE | Facility: HOSPITAL | Age: 42
End: 2022-09-21
Payer: MEDICAID

## 2023-08-30 NOTE — HISTORICAL [facility] CERNER
This is a historical note converted from Cerlinda. Formatting and pictures may have been removed.  Please reference Jacques for original formatting and attached multimedia. Chief Complaint  NEEDS PCP, C/O GENERALIZED PAIN, L SHOULDER MUSCLE SPASMS, SINUS PRESSURE  History of Present Illness  PMHx: RA, HTN, bipolar disorder, carpal tunnel syndrome; chronic sinus congestion  ?   This is a 38 y.o AAF who presents to medicine clinic to establish care, seen by me for the first time.  Reports hx of carpal tunnel syndrome, evaluated by ortho in the past, told to wear a splint but she lost it and states the pain is not bad right now.  Also with hx of bipolar/ depression - on trazodone and paroxetine; sees Dr De Leon at [facility]. No SI/HI in clinic right now.  Patient reports that she was diagnosed with RA a few years ago when she was pregnant with her youngest child and was lost to f/u; has been treating arthritic pain with OTC NSAID medications with relief. Endorsing diffuse body aches today. Also endorsing hx of stress incontinence; sees GYN. Denies sob, cp, abdominal pain, blood in urine/stool. No falls, trauma, no changes in mental status.  Review of Systems  Except as documented, all other systems reviewed and are negative.  Physical Exam  Vitals & Measurements  T:?36.8? ?C (Oral)? HR:?77(Peripheral)? RR:?18? BP:?148/90?  HT:?170?cm? WT:?124.5?kg? BMI:?43.08?  Gen: NAD, obese  HEENT: NCAT, EOMI, moist oral mucosa  Resp: CTAB. Normal inspiratory effort. No cough. No hemoptysis, no crackles, wheezing.  CVS: S1, S2, regular. no murmur. 2+ pedal pulses, no edema.  Abd: NT, ND, soft, no masses. BS2+ all quadrants  MSK: NROM, no deformity  Skin: no rash, no skin changes  Neuro: AAOx3, CNII-XII intact. no focal deficits. Strength 5/5 b/l; sensation intact  Assessment/Plan  Hypertension  Currently on no BP meds; BP is 148/90  Starting Norvasc 10mg today  Patient to keep BP log  DASH diet and exercise encouraged  ?  Rheumatoid  arthritis  Currently endorsing b/l wrist and shoulder pain  Not on any DMARDs at this time  also w/ hx of carpal tunnel syndrome  RF panel ordered  Rx for NSAIDs for now  Re-assess in 3 months; will refer to rheum as well  ?  Hx of Bipolar Disorder / Depression  Currently taking Paroxetine, Trazodone  Followed by outside psychiatrist  will defer mgmt to them  ?  ?  Health Maintenance:  TDAP: boostrix today 1/30/2019  CAGE screening: next visit  HIV: NR 8/2018  Hepatitis: Neg 8/2018  Pneumococcal vaccine: N/A  Influenza vaccine: today 1/30/2019  Zoster vaccine: N/A  AAA U/S screening: N/A  Screening colonoscopy: over age 50  Pap smear: sees GYN  ?  ?   Counselling:  - Patient instructed to limit alcohol use  - Patient counselled on smoking cessation  - Patient instructed to keep BP diary  - Relevant educational materials provided  ?   Ordered CBC, BMP, UA, UDS, A1C, TSH  Imaging: none  Medications: reconciled, discussed and refills given.  RTC in?4 months   Problem List/Past Medical History  Ongoing  Bipolar 1 disorder  Carpal tunnel  Carpal tunnel syndrome  De Quervains tenosynovitis  HTN - Hypertension  Morbid obesity  RA (rheumatoid arthritis)  Historical  No qualifying data  Procedure/Surgical History  btl  Tubal ligation   Medications  calamine topical lotion, 1 christiano, TOP, q4hr, PRN,? ?Not taking  HYDROXYZINE BRAN 25 MG CAP, 25 mg= 1 cap(s), BID  Medrol Dosepak 4 mg oral tablet, 1 packet(s), Oral, As Directed,? ?Not Taking, Completed Rx  PAROXETINE HCL 20 MG TABLET, 20 mg= 1 tab(s), Daily  Promethazine DM 6.25 mg-15 mg/5 mL oral syrup, 5 mL, Oral, q6hr, PRN,? ?Not Taking, Completed Rx  TRAZODONE 100 MG TABLET, 100 mg= 1 tab(s), qPM  Allergies  No Known Medication Allergies  Social History  Alcohol - Low Risk, 09/12/2014  Current, Liquor, 1-2 times per month, Alcohol use interferes with work or home: No. Drinks more than intended: No. Others hurt by drinking: No. Ready to change: No. Household alcohol concerns:  No., 01/30/2019  Employment/School  Employed, Activity level: Occasional physical work. Highest education level: University degree(s). Operates hazardous equipment: No., 10/13/2015  Exercise  Self assessment: Poor condition., 10/13/2015  Home/Environment  Lives with Spouse. Living situation: Home/Independent. Alcohol abuse in household: No. Substance abuse in household: No. Smoker in household: No. Injuries/Abuse/Neglect in household: No. Feels unsafe at home: No. Family/Friends available for support: Yes. Concern for family members at home: No. Major illness in household: No. Financial concerns: No. TV/Computer concerns: No., 10/13/2015  Nutrition/Health  Type of diet: regular. Regular, Wants to lose weight: No. Sleeping concerns: Yes. Feels highly stressed: Yes., 10/13/2015  Substance Abuse - Denies Substance Abuse, 09/12/2014  Never, 03/02/2016  Tobacco - Medium Risk, 03/09/2015  Cigars or pipes but not daily within last 30 days, Cigars, No, Ready to change: No., 01/30/2019  Family History  Hypertension.: Sister.  Immunizations  Vaccine Date Status Comments   influenza virus vaccine, inactivated 10/13/2015 Given Pt helen well .voices no complaints .novisible distress noted   Health Maintenance  Health Maintenance  ???Pending?(in the next year)  ??? ??OverDue  ??? ? ? ?Diabetes Screening due??and every?  ??? ? ? ?Blood Pressure Screening due??07/31/18??and every 1??year(s)  ??? ? ? ?Hypertension Management-Blood Pressure due??07/31/18??and every 1??year(s)  ??? ??Due?  ??? ? ? ?Alcohol Misuse Screening due??01/30/19??and every 1??year(s)  ??? ? ? ?Hypertension Maintenance-Medication Prescribed due??01/30/19??and every 1??year(s)  ??? ? ? ?Hypertension Management-Education due??01/30/19??and every 1??year(s)  ??? ? ? ?Tetanus Vaccine due??01/30/19??and every 10??year(s)  ??? ??Due In Future?  ??? ? ? ?Cervical Cancer Screening not due until??03/11/19??and every 3??year(s)  ??? ? ? ?Hypertension Management-BMP not due  until??05/20/19??and every 1??year(s)  ??? ? ? ?Smoking Cessation not due until??08/06/19??and every 1??year(s)  ??? ? ? ?Obesity Screening not due until??12/04/19??and every 1??year(s)  ??? ? ? ?Body Mass Index Check not due until??01/13/20??and every 1??year(s)  ???Satisfied?(in the past 1 year)  ??? ??Satisfied?  ??? ? ? ?ADL Screening on??01/30/19.??Satisfied by Joya Hickman LPN  ??? ? ? ?Blood Pressure Screening on??01/13/19.??Satisfied by Skyler Mathew RN.  ??? ? ? ?Body Mass Index Check on??12/04/18.??Satisfied by González Gold RN  ??? ? ? ?Depression Screening on??01/30/19.??Satisfied by Joya Hickman LPN  ??? ? ? ?Diabetes Screening on??05/20/18.??Satisfied by Dolly Mejia  ??? ? ? ?Hypertension Management-Blood Pressure on??01/13/19.??Satisfied by Skyler Mathew RN  ??? ? ? ?Obesity Screening on??12/04/18.??Satisfied by González Gold RN  ??? ? ? ?Smoking Cessation on??08/06/18.??Satisfied by Berna Tejada RN  ?  ?      Reviewed notes, labs and imaging if any. discussed assessment and plan with resident and agree with all the above findings.  No additional comments.   No